# Patient Record
Sex: FEMALE
[De-identification: names, ages, dates, MRNs, and addresses within clinical notes are randomized per-mention and may not be internally consistent; named-entity substitution may affect disease eponyms.]

---

## 2017-02-20 ENCOUNTER — CHARTING TRANS (OUTPATIENT)
Dept: OTHER | Age: 42
End: 2017-02-20

## 2017-02-21 ENCOUNTER — CHARTING TRANS (OUTPATIENT)
Dept: INTERNAL MEDICINE | Age: 42
End: 2017-02-21

## 2018-11-29 VITALS
HEART RATE: 64 BPM | DIASTOLIC BLOOD PRESSURE: 64 MMHG | HEIGHT: 60 IN | RESPIRATION RATE: 16 BRPM | BODY MASS INDEX: 26.31 KG/M2 | TEMPERATURE: 97.9 F | WEIGHT: 134 LBS | SYSTOLIC BLOOD PRESSURE: 90 MMHG

## 2019-04-18 ENCOUNTER — OFFICE VISIT (OUTPATIENT)
Dept: FAMILY MEDICINE CLINIC | Facility: CLINIC | Age: 44
End: 2019-04-18

## 2019-04-18 VITALS
HEART RATE: 66 BPM | RESPIRATION RATE: 18 BRPM | HEIGHT: 59.5 IN | BODY MASS INDEX: 26.26 KG/M2 | OXYGEN SATURATION: 99 % | WEIGHT: 132 LBS | SYSTOLIC BLOOD PRESSURE: 94 MMHG | DIASTOLIC BLOOD PRESSURE: 56 MMHG | TEMPERATURE: 98 F

## 2019-04-18 DIAGNOSIS — Z13.0 SCREENING FOR ENDOCRINE, NUTRITIONAL, METABOLIC AND IMMUNITY DISORDER: ICD-10-CM

## 2019-04-18 DIAGNOSIS — Z00.00 ANNUAL PHYSICAL EXAM: Primary | ICD-10-CM

## 2019-04-18 DIAGNOSIS — Z12.39 SCREENING FOR MALIGNANT NEOPLASM OF BREAST: ICD-10-CM

## 2019-04-18 DIAGNOSIS — Z13.6 SCREENING FOR HEART DISEASE: ICD-10-CM

## 2019-04-18 DIAGNOSIS — E55.9 VITAMIN D DEFICIENCY: ICD-10-CM

## 2019-04-18 DIAGNOSIS — Z13.228 SCREENING FOR ENDOCRINE, NUTRITIONAL, METABOLIC AND IMMUNITY DISORDER: ICD-10-CM

## 2019-04-18 DIAGNOSIS — Z86.2 HISTORY OF ANEMIA: ICD-10-CM

## 2019-04-18 DIAGNOSIS — Z13.21 SCREENING FOR ENDOCRINE, NUTRITIONAL, METABOLIC AND IMMUNITY DISORDER: ICD-10-CM

## 2019-04-18 DIAGNOSIS — Z13.29 SCREENING FOR ENDOCRINE, NUTRITIONAL, METABOLIC AND IMMUNITY DISORDER: ICD-10-CM

## 2019-04-18 DIAGNOSIS — Z13.0 SCREENING FOR IRON DEFICIENCY ANEMIA: ICD-10-CM

## 2019-04-18 DIAGNOSIS — Z12.4 SCREENING FOR CERVICAL CANCER: ICD-10-CM

## 2019-04-18 PROCEDURE — 87624 HPV HI-RISK TYP POOLED RSLT: CPT | Performed by: FAMILY MEDICINE

## 2019-04-18 PROCEDURE — 88175 CYTOPATH C/V AUTO FLUID REDO: CPT | Performed by: FAMILY MEDICINE

## 2019-04-18 PROCEDURE — 99386 PREV VISIT NEW AGE 40-64: CPT | Performed by: FAMILY MEDICINE

## 2019-04-18 RX ORDER — OLOPATADINE HYDROCHLORIDE 1 MG/ML
1 SOLUTION/ DROPS OPHTHALMIC
COMMUNITY
Start: 2019-01-22 | End: 2020-06-01

## 2019-04-18 RX ORDER — CYCLOBENZAPRINE HCL 5 MG
5 TABLET ORAL
COMMUNITY
Start: 2019-01-22 | End: 2019-07-22

## 2019-04-18 RX ORDER — BUSPIRONE HYDROCHLORIDE 5 MG/1
5 TABLET ORAL DAILY
COMMUNITY
Start: 2018-12-14 | End: 2019-07-31

## 2019-04-18 RX ORDER — MONTELUKAST SODIUM 10 MG/1
10 TABLET ORAL NIGHTLY PRN
Refills: 0 | COMMUNITY
Start: 2019-01-22 | End: 2019-07-31

## 2019-04-18 NOTE — PROGRESS NOTES
REASON FOR VISIT:    Geoff Smallwood is a 39year old female who presents for an [address]       , monogamous    menses: q28-35 lasts 3-7  Last pap: >3 years, Dr. Elke Tirado  History of abnormal pap: no  On vit D daily-no - irre 14 03/13/2013    CREATSERUM 0.72 03/13/2013       General Health     How would you describe your current health state?: Fair    Type of Diet: Balanced    How do you maintain positive mental well-being?: Social Interaction; Visiting Friends; Visiting Family blood pressure or other  risk factors No results found for: A1C  GLUCOSE (mg/dL)   Date Value   03/13/2013 80         Gonorrhea Screening if high risk No results found for: GONOCOCCUS    HIV Screening For all adults age 22-65, older adults at increased ris HCl 5 MG Oral Tab Take 5 mg by mouth. Disp:  Rfl:       MEDICAL INFORMATION:   Past Medical History:   Diagnosis Date   • Anxiety    • Arthritis    • Fibromyalgia    • Seasonal allergies       No past surgical history on file.    Family History   Problem Re suspicious lesions  HEENT: atraumatic, normocephalic, ears and throat are clear  EYES:PERRLA, EOMI, normal optic disk, conjunctiva are clear  NECK: supple, no adenopathy, no bruits  CHEST: no chest tenderness  BREAST: no dominant or suspicious mass  LUNGS: Future  - COMP METABOLIC PANEL (14); Future  - LIPID PANEL; Future  - TSH W REFLEX TO FREE T4; Future  - [facility] VANDA 2D+3D SCREENING BILAT (CPT=28049/90701); Future    2. Hx anemia  Start slow iron daily if low    3.  Hx of vit d deficiency  Start otc vit D3 2

## 2019-04-18 NOTE — PATIENT INSTRUCTIONS
Perform labs fasting 8 hours with water or black coffee or or black tea diet  soda only prior to exam.    -Encourage healthy diet of whole food and avoid processed food and sugary drinks and sodas.   Diet should include lean meats and vegetables including 5 Elija todos los calin alimentos de cada tahir de los grupos indicados a continuación. Intente comer el número de porciones recomendado para cada sarah de alimentos.  El tamaño de las porciones se basa en zahida dieta de 2,000 calorías diarias. Hable con victoria médico Las mejores opciones: Carne magra de ave y pescado. Elimine la grasa visible. Prepare la carne a la micaela, al horno, grillada o hervida, en vez de freírla. Quite la piel del charles antes de comerlo.  Limite la cantidad de courtney souza que consume.  [place] du sac

## 2019-04-23 ENCOUNTER — TELEPHONE (OUTPATIENT)
Dept: FAMILY MEDICINE CLINIC | Facility: CLINIC | Age: 44
End: 2019-04-23

## 2019-04-23 PROBLEM — R87.615 PAP SMEAR OF CERVIX UNSATISFACTORY: Status: ACTIVE | Noted: 2019-04-23

## 2019-04-24 NOTE — TELEPHONE ENCOUNTER
LMOM to return call to the office.  Provided pt office phone [phone] along with office hours given.    ----- Message from Janet Mcnair DO sent at 4/23/2019 12:39 PM CDT -----  NL pap and neg HPV- however, inflammation partially obscuring results an

## 2019-04-25 ENCOUNTER — NURSE ONLY (OUTPATIENT)
Dept: FAMILY MEDICINE CLINIC | Facility: CLINIC | Age: 44
End: 2019-04-25

## 2019-04-25 ENCOUNTER — HOSPITAL ENCOUNTER (OUTPATIENT)
Dept: MAMMOGRAPHY | Age: 44
Discharge: HOME | End: 2019-04-25
Attending: FAMILY MEDICINE
Payer: COMMERCIAL

## 2019-04-25 DIAGNOSIS — Z13.0 SCREENING FOR IRON DEFICIENCY ANEMIA: ICD-10-CM

## 2019-04-25 DIAGNOSIS — Z00.00 ANNUAL PHYSICAL EXAM: ICD-10-CM

## 2019-04-25 DIAGNOSIS — E55.9 VITAMIN D DEFICIENCY: ICD-10-CM

## 2019-04-25 DIAGNOSIS — Z13.0 SCREENING FOR ENDOCRINE, NUTRITIONAL, METABOLIC AND IMMUNITY DISORDER: ICD-10-CM

## 2019-04-25 DIAGNOSIS — Z13.6 SCREENING FOR HEART DISEASE: ICD-10-CM

## 2019-04-25 DIAGNOSIS — Z12.39 SCREENING FOR MALIGNANT NEOPLASM OF BREAST: ICD-10-CM

## 2019-04-25 DIAGNOSIS — Z13.228 SCREENING FOR ENDOCRINE, NUTRITIONAL, METABOLIC AND IMMUNITY DISORDER: ICD-10-CM

## 2019-04-25 DIAGNOSIS — Z13.21 SCREENING FOR ENDOCRINE, NUTRITIONAL, METABOLIC AND IMMUNITY DISORDER: ICD-10-CM

## 2019-04-25 DIAGNOSIS — Z13.29 SCREENING FOR ENDOCRINE, NUTRITIONAL, METABOLIC AND IMMUNITY DISORDER: ICD-10-CM

## 2019-04-25 PROCEDURE — 80053 COMPREHEN METABOLIC PANEL: CPT | Performed by: FAMILY MEDICINE

## 2019-04-25 PROCEDURE — 85025 COMPLETE CBC W/AUTO DIFF WBC: CPT | Performed by: FAMILY MEDICINE

## 2019-04-25 PROCEDURE — 77067 SCR MAMMO BI INCL CAD: CPT | Performed by: FAMILY MEDICINE

## 2019-04-25 PROCEDURE — 84443 ASSAY THYROID STIM HORMONE: CPT | Performed by: FAMILY MEDICINE

## 2019-04-25 PROCEDURE — 36415 COLL VENOUS BLD VENIPUNCTURE: CPT | Performed by: FAMILY MEDICINE

## 2019-04-25 PROCEDURE — 80061 LIPID PANEL: CPT | Performed by: FAMILY MEDICINE

## 2019-04-25 PROCEDURE — 77063 BREAST TOMOSYNTHESIS BI: CPT | Performed by: FAMILY MEDICINE

## 2019-04-25 PROCEDURE — 82306 VITAMIN D 25 HYDROXY: CPT | Performed by: FAMILY MEDICINE

## 2019-04-25 NOTE — PROGRESS NOTES
Patient came in for draw of ordered fasting labs. Patient drawn out of left AC, x 1 attempt and tolerated well. Gold, green, and lav tube drawn.

## 2019-04-25 NOTE — TELEPHONE ENCOUNTER
PT in office for lab draw. Reviewed results and recommendations with pt.  Pt verbalized understanding and scheduled appt to repeat pap    Future Appointments   Date Time Provider Violetta Patterson   4/25/2019 12:40 PM BK RADHA RM1 BK [address] [facility]   5/28/20

## 2019-04-29 ENCOUNTER — TELEPHONE (OUTPATIENT)
Dept: FAMILY MEDICINE CLINIC | Facility: CLINIC | Age: 44
End: 2019-04-29

## 2019-04-29 NOTE — TELEPHONE ENCOUNTER
LMOM to return call to the office. Provided pt office phone [phone] along with office hours given.

## 2019-04-29 NOTE — TELEPHONE ENCOUNTER
PT states she will have to call back after 230 pm today because she is at work    ----- Message from Colin Maldonado MD sent at 4/29/2019  6:03 AM CDT -----  + low Vit D. Recommend over-the-counter vitamin D 4000 IU daily.   Recheck vitamin D levels in 6

## 2019-04-30 ENCOUNTER — TELEPHONE (OUTPATIENT)
Dept: FAMILY MEDICINE CLINIC | Facility: CLINIC | Age: 44
End: 2019-04-30

## 2019-04-30 NOTE — TELEPHONE ENCOUNTER
Left detailed message in Kenyan explaining lab results and vitamin D recommendations per Dr. Bret Guardado. Provided pt office phone [phone] along with office hours given.

## 2019-04-30 NOTE — TELEPHONE ENCOUNTER
Pt is returning nurses call in regards to her lab results. Pt requested to be called after 2:30pm, please. Pt prefers Iranian speaking .

## 2019-05-02 NOTE — TELEPHONE ENCOUNTER
LMOM to return call to the office. Provided pt office phone [phone] along with office hours given.      + low Vit D. Recommend over-the-counter vitamin D 4000 IU daily.   Recheck vitamin D levels in 6 months.     Rest of labs stable

## 2019-05-28 ENCOUNTER — OFFICE VISIT (OUTPATIENT)
Dept: FAMILY MEDICINE CLINIC | Facility: CLINIC | Age: 44
End: 2019-05-28

## 2019-05-28 VITALS
RESPIRATION RATE: 18 BRPM | SYSTOLIC BLOOD PRESSURE: 96 MMHG | WEIGHT: 131 LBS | TEMPERATURE: 98 F | OXYGEN SATURATION: 99 % | DIASTOLIC BLOOD PRESSURE: 56 MMHG | HEART RATE: 56 BPM | BODY MASS INDEX: 26 KG/M2

## 2019-05-28 DIAGNOSIS — G56.03 BILATERAL CARPAL TUNNEL SYNDROME: Primary | ICD-10-CM

## 2019-05-28 DIAGNOSIS — R87.615 PAP SMEAR OF CERVIX UNSATISFACTORY: ICD-10-CM

## 2019-05-28 DIAGNOSIS — N72 INFLAMMATION OF CERVIX: ICD-10-CM

## 2019-05-28 DIAGNOSIS — R87.618 OTHER ABNORMAL CYTOLOGICAL FINDING OF SPECIMEN FROM CERVIX: ICD-10-CM

## 2019-05-28 PROBLEM — R87.619 ABNORMAL PAP SMEAR OF CERVIX: Status: ACTIVE | Noted: 2019-05-28

## 2019-05-28 PROCEDURE — 87624 HPV HI-RISK TYP POOLED RSLT: CPT | Performed by: FAMILY MEDICINE

## 2019-05-28 PROCEDURE — 88175 CYTOPATH C/V AUTO FLUID REDO: CPT | Performed by: FAMILY MEDICINE

## 2019-05-28 PROCEDURE — 99213 OFFICE O/P EST LOW 20 MIN: CPT | Performed by: FAMILY MEDICINE

## 2019-05-28 RX ORDER — MELOXICAM 15 MG/1
15 TABLET ORAL DAILY
Qty: 30 TABLET | Refills: 0 | Status: SHIPPED | OUTPATIENT
Start: 2019-05-28 | End: 2019-05-29 | Stop reason: SINTOL

## 2019-05-28 NOTE — PATIENT INSTRUCTIONS
As of October 6th 2014, the Drug Enforcement Agency [facility]) is reclassifying all hydrocodone combination medications from Schedule III to Schedule II. This includes medications such as Norco, Vicodin, Lortab, Zohydro, and Vicoprofen.      What this means for effects. Your are at risk of GI upset, stomach ulcer, gastrointestional bleeding while taking anti-inflammatory medications. If you are having stomach pain or worsening heartburn, stop the medication and call a physician immediately.  If black stool, go to mañana  · Dificultad para cerrar la mano en un puño  · Debilidad y torpeza en la mano  Tratamiento del síndrome del túnel carpiano  Ciertos tratamientos pueden ayudar a reducir la presión sobre el nervio mediano y [employer] síntomas.  Las opciones de trat el antebrazo hasta la colon de la [place]. Puede pellizcarse o presionarse cuando pasa por el elvi bernal desde la [employer]  Dormir de esta forma incorrecta puede hacer que sienta entumecimiento, hormigueo, dolor o debilidad en la mano y WATZING forzado y repetitivo. [place] le da tiempo a victoria sandie para recuperarse. El uso de herramientas eléctricas ayuda a reducir la fuerza. Fort[place] los [employer]  Los músculos débiles pueden causar zahida bernice posición de la sandie o del [facility].  [address] [place] [address] dedos y las muñecas a menudo para evitar la rigidez. 4. Algunas veces los cambios en el lugar de trabajo pueden [employer] síntomas.  Si usted pasa la mayor parte del día escribiendo en un teclado, puede ser útil que cambie la posición del teclado o que a S[place]lo victoria médico puede diagnosticar y tratar un problema de shelli.

## 2019-05-28 NOTE — PROGRESS NOTES
CHIEF COMPLAINT: Patient presents with:  Joint Pain: joint pain and bilateral arm pain; right arm more than left    HPI:     Luigi Almonte is a 39year old female presents for repeat Pap due to inflammation and has an end of menstrual cycle with slig Tab  Disp:  Rfl: 0   Cyclobenzaprine HCl 5 MG Oral Tab Take 5 mg by mouth. Disp:  Rfl:    busPIRone HCl 5 MG Oral Tab Take 5 mg by mouth.  Disp:  Rfl:        Allergies:    Etodolac                NAUSEA ONLY    Comment:dizziness             dizziness  Aspir Total 04/25/2019 9.0    • Calculated Osmolality 04/25/2019 286    • GFR, Non- 04/25/2019 106    • GFR, -American 04/25/2019 122    • AST 04/25/2019 18    • ALT 04/25/2019 22    • Alkaline Phosphatase 04/25/2019 53    • Bilirubin, Tot Comment 04/18/2019                      Value: This result contains rich text formatting which cannot be displayed here. • HPV High Risk mRNA 04/18/2019                      Value: This result contains rich text formatting which cannot be displayed here. stomach upset and call  Dispense: 30 tablet; Refill: 0  Pathophysiology discussed  Sleep in splints, wear at work if needed  Risks and benefits of NSAIDs discussed-take with food and water stop quality stomach upset or vomiting or burning in the stomach.

## 2019-05-29 ENCOUNTER — TELEPHONE (OUTPATIENT)
Dept: FAMILY MEDICINE CLINIC | Facility: CLINIC | Age: 44
End: 2019-05-29

## 2019-05-29 DIAGNOSIS — G56.03 BILATERAL CARPAL TUNNEL SYNDROME: Primary | ICD-10-CM

## 2019-05-29 NOTE — TELEPHONE ENCOUNTER
LMOM to return call to the office. Provided pt office phone [phone] along with office hours given. Left detailed message informing patient of allergy to meloxicam and need to discuss alternate option.  Left message in Amharic to return call

## 2019-05-29 NOTE — TELEPHONE ENCOUNTER
Sukhjinder Cortes from [address] called stating there is an allergy issue with the prescribed medication used for pain and inflammation. Please call tracy back at [phone].

## 2019-05-29 NOTE — TELEPHONE ENCOUNTER
LMOM to return call to the office as this is my 2nd attempt to try to reach you. Provided pt office phone [phone] along with office hours given.

## 2019-05-29 NOTE — TELEPHONE ENCOUNTER
Possible for reaction since ASA allergy- facial swelling  Hold Nsaids- discontinue meloxicam  Offer pt medrol dose pack. If agrees, then call to pharmacy. Discussed medrol dose and how to take at yesterday OV when discussed tx options.  Must take with food

## 2019-05-30 ENCOUNTER — TELEPHONE (OUTPATIENT)
Dept: FAMILY MEDICINE CLINIC | Facility: CLINIC | Age: 44
End: 2019-05-30

## 2019-05-31 NOTE — TELEPHONE ENCOUNTER
Discussed with patient pathophysiology and depth of carpal tunnel syndrome and conservative treatment. Patient needs to  splints it is okay if she does not want to take medication.   If she has still has pain in the next 2 to 3 weeks and she can fol

## 2019-05-31 NOTE — TELEPHONE ENCOUNTER
S/w pt. Pt states she does not want to take medrol dose pack, or any other medications. Pt has yet to  wrist splints. Pt had previously cancelled appt that was sched for 6/17.  Pt wants rec if should f/u in 3 weeks with PCP or if any other testing or

## 2019-05-31 NOTE — TELEPHONE ENCOUNTER
Using  services, informed pt of md recommendations. Pt verbalized understanding.  Attempted provide pt information for for hand ortho specialist and contact information, pt states she will call back in 2 weeks for information if symptoms not reso

## 2019-06-29 ENCOUNTER — OFFICE VISIT (OUTPATIENT)
Dept: FAMILY MEDICINE CLINIC | Facility: CLINIC | Age: 44
End: 2019-06-29

## 2019-06-29 DIAGNOSIS — M54.42 ACUTE LEFT-SIDED LOW BACK PAIN WITH LEFT-SIDED SCIATICA: Primary | ICD-10-CM

## 2019-06-29 PROCEDURE — 99213 OFFICE O/P EST LOW 20 MIN: CPT | Performed by: NURSE PRACTITIONER

## 2019-06-29 RX ORDER — METHYLPREDNISOLONE 4 MG/1
TABLET ORAL
Qty: 1 KIT | Refills: 0 | Status: SHIPPED | OUTPATIENT
Start: 2019-06-29 | End: 2019-07-22 | Stop reason: ALTCHOICE

## 2019-06-29 NOTE — PATIENT INSTRUCTIONS
Ciática  La ciática es zahida afección que produce dolor en la parte baja de la espalda y [place] glúteos, la cadera y la pierna. En ocasiones, podría doler la pierna sin que haya dolor en la espalda.  La ciática se produce cuando un nervio lombardi está ir · Es posible que necesite permanecer en cama los primeros días. Allegra, tan pronto ghassan le sea posible, comience a sentarse o caminar. Eso le ayudará a evitar los problemas que se producen por estar mucho tiempo en cama.   · Mientras esté en la cama, intente Si le hall hecho radiografías, las evaluará un radiólogo. Le informarán de los nuevos hallazgos que puedan afectar victoria atención [place]. ¿Cuándo debe buscar atención médica?   Llame a victoria proveedor de Michael [facility] de inmediato si sucede cualquiera de las si

## 2019-06-29 NOTE — PROGRESS NOTES
CHIEF COMPLAINT:     No chief complaint on file. HPI:   Anna Stock is a 39year old female who is here for complaints of back pain. Pain is located at left low back, low back.  Pain is described as aching, sharp, shooting, moves down left but There were no vitals taken for this visit.    GENERAL: well developed, well nourished,in no apparent distress  SKIN: no rashes,no suspicious lesions  NECK: supple,no adenopathy,no bruits  LUNGS: clear to auscultation  CARDIO: RRR without murmur  GI: normoac La ciática es zahida afección que produce dolor en la parte baja de la espalda y [place] glúteos, la cadera y la pierna. En ocasiones, podría doler la pierna sin que haya dolor en la espalda.  La ciática se produce cuando un nervio lombardi está irritado o cu · Es posible que necesite permanecer en cama los primeros días. Allegra, tan pronto ghassan le sea posible, comience a sentarse o caminar. Eso le ayudará a evitar los problemas que se producen por estar mucho tiempo en cama.   · Mientras esté en la cama, intente Si le hall hecho radiografías, las evaluará un radiólogo. Le informarán de los nuevos hallazgos que puedan afectar victoria atención [place]. ¿Cuándo debe buscar atención médica?   Llame a victoria proveedor de Michael [facility] de inmediato si sucede cualquiera de las si

## 2019-07-22 ENCOUNTER — OFFICE VISIT (OUTPATIENT)
Dept: FAMILY MEDICINE CLINIC | Facility: CLINIC | Age: 44
End: 2019-07-22

## 2019-07-22 VITALS
RESPIRATION RATE: 18 BRPM | TEMPERATURE: 98 F | SYSTOLIC BLOOD PRESSURE: 98 MMHG | BODY MASS INDEX: 25 KG/M2 | DIASTOLIC BLOOD PRESSURE: 60 MMHG | WEIGHT: 127.81 LBS | HEART RATE: 74 BPM | OXYGEN SATURATION: 100 %

## 2019-07-22 DIAGNOSIS — M79.7 FIBROMYALGIA: ICD-10-CM

## 2019-07-22 DIAGNOSIS — M54.16 LUMBAR BACK PAIN WITH RADICULOPATHY AFFECTING LEFT LOWER EXTREMITY: ICD-10-CM

## 2019-07-22 DIAGNOSIS — S46.811A TRAPEZIUS STRAIN, RIGHT, INITIAL ENCOUNTER: ICD-10-CM

## 2019-07-22 DIAGNOSIS — R31.29 MICROSCOPIC HEMATURIA: ICD-10-CM

## 2019-07-22 DIAGNOSIS — R30.0 BURNING WITH URINATION: Primary | ICD-10-CM

## 2019-07-22 LAB
APPEARANCE: CLEAR
MULTISTIX LOT#: ABNORMAL NUMERIC
PH, URINE: 5.5 (ref 4.5–8)
SPECIFIC GRAVITY: 1.02 (ref 1–1.03)
URINE-COLOR: YELLOW
UROBILINOGEN,SEMI-QN: 0.2 MG/DL (ref 0–1.9)

## 2019-07-22 PROCEDURE — 81003 URINALYSIS AUTO W/O SCOPE: CPT | Performed by: FAMILY MEDICINE

## 2019-07-22 PROCEDURE — 87077 CULTURE AEROBIC IDENTIFY: CPT | Performed by: FAMILY MEDICINE

## 2019-07-22 PROCEDURE — 99214 OFFICE O/P EST MOD 30 MIN: CPT | Performed by: FAMILY MEDICINE

## 2019-07-22 PROCEDURE — 87086 URINE CULTURE/COLONY COUNT: CPT | Performed by: FAMILY MEDICINE

## 2019-07-22 RX ORDER — NITROFURANTOIN 25; 75 MG/1; MG/1
100 CAPSULE ORAL 2 TIMES DAILY
Qty: 14 CAPSULE | Refills: 0 | Status: SHIPPED | OUTPATIENT
Start: 2019-07-22 | End: 2019-07-31 | Stop reason: ALTCHOICE

## 2019-07-22 RX ORDER — NAPROXEN 250 MG/1
250 TABLET ORAL 2 TIMES DAILY WITH MEALS
Qty: 30 TABLET | Refills: 0 | Status: SHIPPED | OUTPATIENT
Start: 2019-07-22 | End: 2019-07-31 | Stop reason: ALTCHOICE

## 2019-07-22 RX ORDER — CYCLOBENZAPRINE HCL 10 MG
10 TABLET ORAL NIGHTLY PRN
Qty: 30 TABLET | Refills: 0 | Status: SHIPPED | OUTPATIENT
Start: 2019-07-22 | End: 2020-01-07

## 2019-07-22 NOTE — PROGRESS NOTES
CHIEF COMPLAINT: Patient presents with:  Back Pain: Left side; pain radiates from neck down to leg   Urinary Symptoms (urologic): burning with urination     HPI:     Aurelio Perry is a 39year old female presents for complains of low left-sided back Sister    • No Known Problems Brother    • No Known Problems Brother    • No Known Problems Brother    • No Known Problems Daughter    • No Known Problems Son    • ADHD Son       Social History: Social History    Tobacco Use      Smoking status: Never Smok extensionand able to perform chin to chest with no difficulty. With head extension and caudad pressure patient denies tingling or lightening sensation in back. Heart: RRR without S3 or S4 murmur . Clear S1S2  Lungs: clear to auscultation bilaterally.  No r Normal magnetic resonance imaging of the cervical spine. Janie Carvajal. RAULITO Hong.                                                      [facility] At L5-S1 there is broad-based bulging of the annulus fibrosus with an   annular tear and with degenerative endplate changes at the L5-S1 disk   space level.   In addition, I suspect that there is a small   sup Dispense: 30 tablet; Refill: 0   risks and benefits of NSAIDs discussed-take with food and water risk of GI bleed.   If hives or facial swelling stop immediately and call  neuro is intact with positive left-sided straight leg raising test  Has degenerative learning. Medical education done. Outcome: Patient verbalizes understanding. Patient is notified to call with any questions, comp lications, allergies, or worsening or changing symptoms.   Patient is to call with any side effects or complications from the

## 2019-07-22 NOTE — PATIENT INSTRUCTIONS
As of October 6th 2014, the Drug Enforcement Agency [facility]) is reclassifying all hydrocodone combination medications from Schedule III to Schedule II. This includes medications such as Norco, Vicodin, Lortab, Zohydro, and Vicoprofen.      What this means for points shown above. Fibromyalgia is a condition that causes pain at specific points on the body, stiffness, and fatigue. What are the symptoms of fibromyalgia? In most cases, you will have tender points on your body.  These points are very sore, espec be helpful for fibromyalgia. Some people find alternative treatments such as massage, chiropractic treatments, biofeedback, or acupuncture also help with symptoms. Date Last Reviewed: 6/1/2018  © 5219-3605 [employer] [address].  [facility] o pararse para evitar los problemas que pueden aparecer cuando tahir está en cama mucho tiempo (debilidad de los músculos, mayor dolor y rigidez de la espalda, coágulos de liyah en las piernas).   2. Mientras esté en cama, intente buscar zahida posición que le radiografías (X-rays), éstas serán evaluadas por un especialista.  Le informarán de los nuevos hallazgos que puedan afectar la atención médica que necesita.]  Busque Prontamente Atención Médica  si algo de lo siguiente ocurre:  · El dolor empeora o se espar puede relajar los músculos adoloridos y mejorar el flujo de Zaid. · Pruebe a bañarse o ducharse con agua tibia. También puede usar zahida almohadilla térmica graduada en bajo. Para evitar quemarse, mantenga un paño entre victoria piel y la almohadilla térmica. Para diagnosticar y tratar la disuria crónica puede ser necesaria la derivación a un especialista (urólogo). Cuidado En [place]:  · Si le tomaron zahida muestra de orina para cultivo puede llamar en dos días por el resultado.   · Si se hizo un cultivo para Sydell Prow e your healthcare provider to help find new stones. You may need follow-up every 3 months to a year for a lifetime. Drink lots of water  Staying well-hydrated is the best way to reduce your risk of future stones. Drink 8 12-ounce glasses of water daily.  H [address]. All rights reserved. This information is not intended as a substitute for professional medical care. Always follow your healthcare professional's instructions. Understanding Kidney Stones  Your kidneys are bean-shaped organs.  They help filter often depend on your stone’s size and location. Fever may indicate a serious infection. Call your healthcare provider right away if you develop a fever. Date Last Reviewed: 1/1/2017  © 2535-5420 The [address].  Nicolás Reyes [address]ih[address] Hendricks,

## 2019-07-23 ENCOUNTER — TELEPHONE (OUTPATIENT)
Dept: FAMILY MEDICINE CLINIC | Facility: CLINIC | Age: 44
End: 2019-07-23

## 2019-07-23 NOTE — TELEPHONE ENCOUNTER
Patient needs new script for medication prescribed. States pharmacist let her know it may have an ingredient she is allergic to. Please call back.      [phone]

## 2019-07-24 RX ORDER — IBUPROFEN 600 MG/1
600 TABLET ORAL EVERY 8 HOURS PRN
Qty: 40 TABLET | Refills: 0 | Status: SHIPPED | OUTPATIENT
Start: 2019-07-24 | End: 2019-07-31 | Stop reason: ALTCHOICE

## 2019-07-24 NOTE — TELEPHONE ENCOUNTER
Pharmacist informed that pcp is ok with ibuprofen rx and that patient must stop and seek medical attention immediately if experiences any facial swelling or difficulty breathing.  Pharmacist v/u

## 2019-07-24 NOTE — TELEPHONE ENCOUNTER
Anna Jones from [facility] is calling, stating the medication prescribed for pain and inflammation has a reaction on pt. Pt has allergies to some off the ingredients.  Please call pharmacy back as soon as possible to speak with them to see if it can be swi

## 2019-07-24 NOTE — TELEPHONE ENCOUNTER
Maty Jackson with ibuprofen- please call pharmacy.  If any facial swelling or breathing issues, stop and call immediately

## 2019-07-24 NOTE — TELEPHONE ENCOUNTER
Pharmacist states Naproxen conflicts with patient's aspirin allergy. Patient had previously reported minor swelling with aspirin but pharmacists states that patient has taken ibuprofen in the past with no issues.      Pharmacist would like the \"ok\" from p

## 2019-07-25 ENCOUNTER — TELEPHONE (OUTPATIENT)
Dept: FAMILY MEDICINE CLINIC | Facility: CLINIC | Age: 44
End: 2019-07-25

## 2019-07-25 NOTE — TELEPHONE ENCOUNTER
Notes recorded by Sis Ross DO on 7/24/2019 at 11:55 PM CDT  On Macrodantin-awaprincess sensitivities

## 2019-07-26 ENCOUNTER — TELEPHONE (OUTPATIENT)
Dept: FAMILY MEDICINE CLINIC | Facility: CLINIC | Age: 44
End: 2019-07-26

## 2019-07-26 NOTE — TELEPHONE ENCOUNTER
Result Notes for URINE CULTURE, ROUTINE     Notes recorded by Humberto Giles DO on 7/26/2019 at 11:28 AM CDT  Pt with uti on macrobid. Please call and make sure improving. No sensitivity testing performed.   ------    Notes recorded by Humberto Giles DO o

## 2019-07-29 NOTE — TELEPHONE ENCOUNTER
Kota Carter called to inform this RN finished Hardik Isaac today 7/29/2019, still having pain with urination, also feeling as if she has chills, denies having taken her temperature.  Patient states she is unable to drink a lot of water while at work due to not allow

## 2019-07-29 NOTE — TELEPHONE ENCOUNTER
LMOM to return call to the office as this is my 2nd attempt to try to reach you. Provided pt office phone [phone] along with office hours given. Attempted to inform of Dr. Derrick Hart message : Pt with UTI on macrobid.  Please call and make sure im

## 2019-07-30 NOTE — TELEPHONE ENCOUNTER
Left detailed message informing patient she needs to be re-evaluated today. Informed her pcp is out of the office but may contact to schedule appt with Dr. Sushma Coulter. LMOM to return call to the office.  Provided pt office phone [phone] along with of

## 2019-07-30 NOTE — TELEPHONE ENCOUNTER
She needs to be seen today. At risk of kidney infection if UTI. Elizabeth Felix is not in the office after 1 pm. She will need to be reevaluated in the IC today. If refuses, then offer 8 am appt with  in am. Needs 64oz of water daily.  Please call

## 2019-07-30 NOTE — TELEPHONE ENCOUNTER
She needs to be seen and recheck urine. Antibiotic should have covered. Please offer appt with Wanda Miles who is in office.

## 2019-07-31 ENCOUNTER — HOSPITAL ENCOUNTER (OUTPATIENT)
Dept: CT IMAGING | Age: 44
Discharge: HOME | End: 2019-07-31
Attending: FAMILY MEDICINE
Payer: COMMERCIAL

## 2019-07-31 ENCOUNTER — TELEPHONE (OUTPATIENT)
Dept: FAMILY MEDICINE CLINIC | Facility: CLINIC | Age: 44
End: 2019-07-31

## 2019-07-31 ENCOUNTER — OFFICE VISIT (OUTPATIENT)
Dept: FAMILY MEDICINE CLINIC | Facility: CLINIC | Age: 44
End: 2019-07-31

## 2019-07-31 VITALS
DIASTOLIC BLOOD PRESSURE: 60 MMHG | HEART RATE: 80 BPM | BODY MASS INDEX: 26 KG/M2 | SYSTOLIC BLOOD PRESSURE: 86 MMHG | WEIGHT: 129 LBS | HEIGHT: 58.86 IN | TEMPERATURE: 98 F

## 2019-07-31 DIAGNOSIS — R30.0 BURNING WITH URINATION: ICD-10-CM

## 2019-07-31 DIAGNOSIS — R31.29 MICROSCOPIC HEMATURIA: ICD-10-CM

## 2019-07-31 DIAGNOSIS — N30.01 ACUTE CYSTITIS WITH HEMATURIA: Primary | ICD-10-CM

## 2019-07-31 DIAGNOSIS — J30.2 SEASONAL ALLERGIC RHINITIS, UNSPECIFIED TRIGGER: ICD-10-CM

## 2019-07-31 LAB
BILIRUBIN: NEGATIVE
GLUCOSE (URINE DIPSTICK): NEGATIVE MG/DL
KETONES (URINE DIPSTICK): NEGATIVE MG/DL
MULTISTIX LOT#: ABNORMAL NUMERIC
NITRITE, URINE: NEGATIVE
PH, URINE: 6.5 (ref 4.5–8)
PROTEIN (URINE DIPSTICK): NEGATIVE MG/DL
SPECIFIC GRAVITY: 1.01 (ref 1–1.03)
UROBILINOGEN,SEMI-QN: 0.2 MG/DL (ref 0–1.9)

## 2019-07-31 PROCEDURE — 74176 CT ABD & PELVIS W/O CONTRAST: CPT | Performed by: FAMILY MEDICINE

## 2019-07-31 PROCEDURE — 81003 URINALYSIS AUTO W/O SCOPE: CPT | Performed by: FAMILY MEDICINE

## 2019-07-31 PROCEDURE — 87086 URINE CULTURE/COLONY COUNT: CPT | Performed by: FAMILY MEDICINE

## 2019-07-31 PROCEDURE — 99213 OFFICE O/P EST LOW 20 MIN: CPT | Performed by: FAMILY MEDICINE

## 2019-07-31 RX ORDER — MONTELUKAST SODIUM 10 MG/1
10 TABLET ORAL NIGHTLY PRN
Qty: 90 TABLET | Refills: 1 | Status: SHIPPED | OUTPATIENT
Start: 2019-07-31 | End: 2020-06-01

## 2019-07-31 RX ORDER — OMEGA-3 FATTY ACIDS/FISH OIL 300-1000MG
CAPSULE ORAL DAILY PRN
COMMUNITY
End: 2020-03-03

## 2019-07-31 NOTE — TELEPHONE ENCOUNTER
Called [facility] micro lab to ask about adding on Sensitivities to UCx from today 7/31.  states they do not typically run sensitivities on Corynebacterium growth.

## 2019-07-31 NOTE — TELEPHONE ENCOUNTER
Spoke with patient and informed of  below written message. Patient verbalized and  was rescheduled to be seen today.      Future Appointments   Date Time Provider Violetta Patterson   7/31/2019 10:40 AM Natacha Montes MD [facility]

## 2019-07-31 NOTE — PROGRESS NOTES
CHIEF COMPLAINT: Patient presents with:  UTI  Allergies: requesting a refill on Singulair        HPI:     Cleon Goodpasture is a 39year old female presents for UTI f-u and needs refill of Singulair. Leticia p/w dysuria sx x over 1 week.  She was seen b Never Smoker      Smokeless tobacco: Never Used    Alcohol use: No    Drug use: No       Medications (Active prior to today's visit):    Current Outpatient Medications:  Ibuprofen 200 MG Oral Cap Take 400-600 mg by mouth daily as needed.  Disp:  Rfl:    Cyc and no guarding. No CVAT  +suprapubic tenderness     Neurological: She is alert and oriented to person, place, and time. Skin: Skin is warm and dry.        LABS     Office Visit on 07/22/2019   Component Date Value   • Glucose Urine 07/22/2019 neg    • 04/18/2020  Annual Physical due on 04/18/2020  Mammogram due on 04/25/2020  Pap Smear,3 Years due on 05/28/2022      Patient/Caregiver Education: There are no barriers to learning. Medical education done.    Outcome: Patient verbalizes understanding and agr

## 2019-08-02 ENCOUNTER — TELEPHONE (OUTPATIENT)
Dept: FAMILY MEDICINE CLINIC | Facility: CLINIC | Age: 44
End: 2019-08-02

## 2019-08-02 NOTE — TELEPHONE ENCOUNTER
Patient calling to check status of results and if medication will be sent to pharmacy. Please call back.      [phone]

## 2019-08-02 NOTE — TELEPHONE ENCOUNTER
Reviewed 7/31 Urine Cx, still pending. Called pt to inform of pending UCx results. She states she is feeling much better and that she will be able to drink plenty of water this weekend since she is not working. She has no F/C/N/V, no dysuria sx.  Pt decline

## 2020-01-07 ENCOUNTER — OFFICE VISIT (OUTPATIENT)
Dept: FAMILY MEDICINE CLINIC | Facility: CLINIC | Age: 45
End: 2020-01-07

## 2020-01-07 VITALS
BODY MASS INDEX: 26 KG/M2 | OXYGEN SATURATION: 98 % | RESPIRATION RATE: 18 BRPM | WEIGHT: 129.63 LBS | SYSTOLIC BLOOD PRESSURE: 90 MMHG | HEART RATE: 74 BPM | DIASTOLIC BLOOD PRESSURE: 52 MMHG | TEMPERATURE: 98 F

## 2020-01-07 DIAGNOSIS — Z23 NEED FOR VACCINATION: ICD-10-CM

## 2020-01-07 DIAGNOSIS — M54.16 LUMBAR BACK PAIN WITH RADICULOPATHY AFFECTING LEFT LOWER EXTREMITY: Primary | ICD-10-CM

## 2020-01-07 DIAGNOSIS — S46.811A TRAPEZIUS STRAIN, RIGHT, INITIAL ENCOUNTER: ICD-10-CM

## 2020-01-07 PROCEDURE — 99214 OFFICE O/P EST MOD 30 MIN: CPT | Performed by: FAMILY MEDICINE

## 2020-01-07 RX ORDER — CYCLOBENZAPRINE HCL 10 MG
10 TABLET ORAL NIGHTLY PRN
Qty: 30 TABLET | Refills: 0 | Status: SHIPPED | OUTPATIENT
Start: 2020-01-07 | End: 2020-02-11

## 2020-01-07 RX ORDER — PREDNISONE 20 MG/1
40 TABLET ORAL DAILY
Qty: 10 TABLET | Refills: 0 | Status: SHIPPED | OUTPATIENT
Start: 2020-01-07 | End: 2020-01-12

## 2020-01-07 NOTE — PATIENT INSTRUCTIONS
As of October 6th 2014, the Drug Enforcement Agency [facility]) is reclassifying all hydrocodone combination medications from Schedule III to Schedule II. This includes medications such as Norco, Vicodin, Lortab, Zohydro, and Vicoprofen.      What this means for músculos de la parte baja de la espalda y los abdominales pueden actuar conjuntamente para brindar un buen apoyo a la columna. Los ejercicios descritos a continuación le ayudarán a fortalecer los músculos de la parte baja de la espalda.  Es importante que c gradualmente jed ejercicio hasta repetirlo 20 veces. (Nivel avanzado: Repita jed ejercicio [place] y [employer] piernas al mismo tiempo hasta que estén a unos cuantos centímetros del piso. Sostenga esta posición por 5 segundos y relájese. ) esto 5 veces. Para victoria propia seguridad, consulte con victoria proveedor de atención médica antes de iniciar cualquier programa de ejercicios. Date Last Reviewed: 11/1/2017  © 4541-9616 [employer] [address]. [facility] [facility] [address].  Kelly Scott

## 2020-01-08 NOTE — PROGRESS NOTES
CHIEF COMPLAINT: Patient presents with:  Sciatica: left         HPI:     Jarod Alonso is a 38year old female presents for back and neck pain. Back pain: Pt has had chronic low back pain with sciatica x 7 months. She denies any trauma or inciting event. Known Problems Daughter    • No Known Problems Son    • ADHD Son       Social History: Social History    Tobacco Use      Smoking status: Never Smoker      Smokeless tobacco: Never Used    Alcohol use: No    Drug use: No       Medications (Active prior to well-nourished. HENT:   Head: Normocephalic. Neck: Normal range of motion. Neck supple. Cardiovascular: Normal rate and normal heart sounds. Pulmonary/Chest: Effort normal and breath sounds normal. No respiratory distress.    Musculoskeletal: She e showing disc herniation at L5-S1 with nerve impingement. Neurosurgery was discussed with pt, but pt would like to avoid procedures and surgery,     - cyclobenzaprine 10 MG Oral Tab;  Take 1 tablet (10 mg total) by mouth nightly as needed for Muscle spasms ( persist or worsen.     Problem List:   Patient Active Problem List:     History of anemia     Pap smear of cervix unsatisfactory     Allergic rhinitis     Anemia     Insomnia due to psychological stress     Iron deficiency     Abnormal Pap smear of cervix

## 2020-02-10 ENCOUNTER — TELEPHONE (OUTPATIENT)
Dept: FAMILY MEDICINE CLINIC | Facility: CLINIC | Age: 45
End: 2020-02-10

## 2020-02-10 NOTE — TELEPHONE ENCOUNTER
Call received from patient. Connected call with  services.  ID [id number] Leno Burálvaro    Patient requesting prescription for pain injections for sciatica. Patient is scheduled tomorrow 2/11/2020.  Advised to keep appt with Dr. Vira Lesch to

## 2020-02-10 NOTE — TELEPHONE ENCOUNTER
Pt is calling stating she has really bad Pain in the sciatic nerve, ans has not gone to work for a week now, pt no longer wants to take medication but does want injections or to know what she should do so she can continue her therapy and her work.  Pt needs

## 2020-02-11 ENCOUNTER — TELEPHONE (OUTPATIENT)
Dept: FAMILY MEDICINE CLINIC | Facility: CLINIC | Age: 45
End: 2020-02-11

## 2020-02-11 ENCOUNTER — OFFICE VISIT (OUTPATIENT)
Dept: FAMILY MEDICINE CLINIC | Facility: CLINIC | Age: 45
End: 2020-02-11

## 2020-02-11 VITALS
RESPIRATION RATE: 18 BRPM | WEIGHT: 127.38 LBS | SYSTOLIC BLOOD PRESSURE: 96 MMHG | TEMPERATURE: 98 F | DIASTOLIC BLOOD PRESSURE: 60 MMHG | HEIGHT: 58.75 IN | BODY MASS INDEX: 26.03 KG/M2 | OXYGEN SATURATION: 99 % | HEART RATE: 91 BPM

## 2020-02-11 DIAGNOSIS — M47.9 DEGENERATIVE JOINT DISEASE OF LOW BACK: ICD-10-CM

## 2020-02-11 DIAGNOSIS — M54.59 INTRACTABLE LOW BACK PAIN: ICD-10-CM

## 2020-02-11 DIAGNOSIS — M54.16 LUMBAR BACK PAIN WITH RADICULOPATHY AFFECTING LEFT LOWER EXTREMITY: ICD-10-CM

## 2020-02-11 DIAGNOSIS — M54.59 INTRACTABLE LOW BACK PAIN: Primary | ICD-10-CM

## 2020-02-11 PROCEDURE — 99214 OFFICE O/P EST MOD 30 MIN: CPT | Performed by: FAMILY MEDICINE

## 2020-02-11 RX ORDER — BACLOFEN 10 MG/1
TABLET ORAL
Qty: 30 TABLET | Refills: 0 | Status: SHIPPED | OUTPATIENT
Start: 2020-02-11 | End: 2020-02-28

## 2020-02-11 RX ORDER — BACLOFEN 10 MG/1
10 TABLET ORAL 3 TIMES DAILY
Qty: 30 TABLET | Refills: 0 | Status: SHIPPED | OUTPATIENT
Start: 2020-02-11 | End: 2020-02-11

## 2020-02-11 RX ORDER — IBUPROFEN 800 MG/1
800 TABLET ORAL 3 TIMES DAILY
Qty: 30 TABLET | Refills: 0 | Status: SHIPPED | OUTPATIENT
Start: 2020-02-11 | End: 2020-02-28

## 2020-02-11 RX ORDER — FAMOTIDINE 40 MG/1
40 TABLET, FILM COATED ORAL DAILY
Qty: 30 TABLET | Refills: 0 | Status: SHIPPED | OUTPATIENT
Start: 2020-02-11 | End: 2020-06-01 | Stop reason: ALTCHOICE

## 2020-02-11 RX ORDER — ACETAMINOPHEN 500 MG
500 TABLET ORAL EVERY 6 HOURS PRN
COMMUNITY
End: 2021-03-15 | Stop reason: ALTCHOICE

## 2020-02-11 NOTE — PATIENT INSTRUCTIONS
As of October 6th 2014, the Drug Enforcement Agency [facility]) is reclassifying all hydrocodone combination medications from Schedule III to Schedule II. This includes medications such as Norco, Vicodin, Lortab, Zohydro, and Vicoprofen.      What this means for each dose with food-stop ibuprofen  Take your anti inflammatory medicine with food, stop and call if GI side effects. Your are at risk of GI upset, stomach ulcer, gastrointestional bleeding while taking anti-inflammatory medications.  If you are having stom zahida toalla. (Nunca coloque el hielo directamente sobre la piel.)  · Coloque el hielo sobre la parte de la espalda que más le duele. · No se ponga hielo heriberto más de 20 minutos cada vez. · Deberá usar hielo varias veces al día.   ? Medicamentos  Los calm Earlis Laughter el dolor y la hinchazón  El frío reduce la hinchazón. Tanto el calor ghassan el frío pueden disminuir el dolor. Proteja la piel colocando zahida toalla entre el cuerpo y la mary ann de frío o calor.   · Libby los primeros días, cologen reyna [place], [facility] [facility]. Todos los derechos reservados. Esta información no pretende sustituir la atención médica profesional. Sólo victoria médico puede diagnosticar y tratar un problema de shelli.         Cuidado de la espalda a lo lynne del día  Si usted Ebony de victoria la espalda y los muslos. De esta forma, victoria espalda contará con mejor apoyo. Al caminar, asegúrese de [employer] alineación de las thi curvas dorsales.  Para eso, mantenga victoria rosie, victoria cadera y los dedos de nima pies conectados por zahida línea vertical.   Da la radiculopatía lumbar? Envejecer, zahida lesión, bernice postura, exceso de Remersdaal corporal y otros factores pueden ocasionar problemas en la parte baja de la espalda. Estos problemas pueden luego irritar las raíces nerviosas.  Por ejemplo:  · Daños en un disco ayudarle a aliviar algunos síntomas. En algunos casos, puede necesitar cirugía para corregir el problema subyacente. Eso depende de la causa, los síntomas y cuánto hace que tiene el dolor.   Posibles complicaciones  Con el tiempo, un nervio irritado e infl

## 2020-02-11 NOTE — TELEPHONE ENCOUNTER
[facility] called she needs clarification on directions of prescription for medication Baclofen, since two sets of directions was sent to them.

## 2020-02-11 NOTE — TELEPHONE ENCOUNTER
Pt called to check on the status of Baclofen and Famotidine medications. Pt states that [facility] Leone was calling to get clarification on Baclofen, but she thinks that they are out of stock for Famotidine?      Informed pt that nurse will call Marlyn Leone to get cl

## 2020-02-11 NOTE — TELEPHONE ENCOUNTER
Update baclofen prescription to pharmacy   Spoke with pharmacy to confirm they received it. Famotidine is on back order for a long time due to ranitidine  pharmacies have been recommending OTC,  Ok to do?

## 2020-02-12 ENCOUNTER — TELEPHONE (OUTPATIENT)
Dept: FAMILY MEDICINE CLINIC | Facility: CLINIC | Age: 45
End: 2020-02-12

## 2020-02-12 NOTE — TELEPHONE ENCOUNTER
Dr. Vira Lesch, patient requesting note excusing her from work from 02/03/2020 through 02/23/2020. She will return to work on 02/24/2020. Note pended. Any limitations? Okay for patient to wait to see Dr. Ekaterina Lawrence until March?

## 2020-02-12 NOTE — TELEPHONE ENCOUNTER
Patient called and said that she was referred to a Dr Collette Preciado but she can not get into see him until March wants to know if that is OK and also needs a work not to be out of work for three weeks.

## 2020-02-12 NOTE — PROGRESS NOTES
CHIEF COMPLAINT: Patient presents with:  Low Back Pain: Lt side, pain radiating down Left leg         HPI:     Sneha Lan is a 38year old female presents for bilateral perivertebral and spinal lumbar back pain at L4/L5/S1 level radiating into left calf Brother    • No Known Problems Daughter    • No Known Problems Son    • ADHD Son       Social History: Social History    Tobacco Use      Smoking status: Never Smoker      Smokeless tobacco: Never Used    Alcohol use: No    Drug use: No       Medications ( Well-nourished, well hydrated. No acute distress. No pallor. No tachypnea  HEENT: Head is normocephalic and atraumatic. Sclera clear and non icteric bilaterally. Moist mucous membranes. Neck: supple. No adenopathy. No thyromegaly.   Neck nontender with no 07/31/2019 Slightly Cloudy    • Color Urine 07/31/2019 Pale Yellow/Pink Tinge    • Multistix Lot# 07/31/2019 [id number]    • Multistix Expiration Date 07/31/2019 10/31/2019    • Urine Culture 07/31/2019 No Growth at 18-24 hrs.        Result Narrative           addition I believe that there may be a small   superimposed central disk herniation.  This is in direct contact with   the S1 nerve roots bilaterally though I do not see a great deal of   mass effect on the S1 nerve roots.  There are mild facet joint   deg 800 MG Oral Tab 30 tablet 0     Sig: Take 1 tablet (800 mg total) by mouth 3 (three) times daily. Take with food and water. Stop if GI side effects   • famotidine 40 MG Oral Tab 30 tablet 0     Sig: Take 1 tablet (40 mg total) by mouth daily.  Take with ibu

## 2020-02-13 NOTE — TELEPHONE ENCOUNTER
Patient needs to keep scheduled appointment with Dr. Wally Rodriguez for 2/26/2020. MRI scheduled 2/15/2020. Patient needs to return to work based on consult with Dr. Wally Rodriguez.   Patient may have note stating off work from 2/3/2020-2/27/2020 and may need to be ext

## 2020-02-15 ENCOUNTER — HOSPITAL ENCOUNTER (OUTPATIENT)
Dept: MRI IMAGING | Age: 45
Discharge: HOME | End: 2020-02-15
Attending: FAMILY MEDICINE
Payer: COMMERCIAL

## 2020-02-15 DIAGNOSIS — M54.16 LUMBAR BACK PAIN WITH RADICULOPATHY AFFECTING LEFT LOWER EXTREMITY: ICD-10-CM

## 2020-02-15 DIAGNOSIS — M54.59 INTRACTABLE LOW BACK PAIN: ICD-10-CM

## 2020-02-15 DIAGNOSIS — M47.9 DEGENERATIVE JOINT DISEASE OF LOW BACK: ICD-10-CM

## 2020-02-15 PROCEDURE — 72148 MRI LUMBAR SPINE W/O DYE: CPT | Performed by: FAMILY MEDICINE

## 2020-02-26 ENCOUNTER — TELEPHONE (OUTPATIENT)
Dept: PAIN CLINIC | Facility: CLINIC | Age: 45
End: 2020-02-26

## 2020-02-26 ENCOUNTER — OFFICE VISIT (OUTPATIENT)
Dept: SURGERY | Facility: CLINIC | Age: 45
End: 2020-02-26

## 2020-02-26 ENCOUNTER — OFFICE VISIT (OUTPATIENT)
Dept: PAIN CLINIC | Facility: CLINIC | Age: 45
End: 2020-02-26

## 2020-02-26 VITALS
SYSTOLIC BLOOD PRESSURE: 122 MMHG | BODY MASS INDEX: 23.37 KG/M2 | WEIGHT: 127 LBS | HEIGHT: 62 IN | DIASTOLIC BLOOD PRESSURE: 72 MMHG | HEART RATE: 72 BPM

## 2020-02-26 VITALS
BODY MASS INDEX: 23.37 KG/M2 | SYSTOLIC BLOOD PRESSURE: 110 MMHG | OXYGEN SATURATION: 99 % | HEART RATE: 71 BPM | WEIGHT: 127 LBS | HEIGHT: 62 IN | DIASTOLIC BLOOD PRESSURE: 70 MMHG

## 2020-02-26 DIAGNOSIS — M54.16 LUMBAR RADICULOPATHY: Primary | ICD-10-CM

## 2020-02-26 DIAGNOSIS — M54.16 LUMBAR RADICULITIS: Primary | ICD-10-CM

## 2020-02-26 PROCEDURE — 99204 OFFICE O/P NEW MOD 45 MIN: CPT | Performed by: ANESTHESIOLOGY

## 2020-02-26 PROCEDURE — 99202 OFFICE O/P NEW SF 15 MIN: CPT | Performed by: NEUROLOGICAL SURGERY

## 2020-02-26 NOTE — TELEPHONE ENCOUNTER
Created [facility] Referral for Left TLESI ([id number]) x3  Uploaded Clinicals   Referral #:[id number]  Case Pending

## 2020-02-26 NOTE — PROGRESS NOTES
PHYSICAL EXAM:   Physical Exam   Constitutional:           Patient presents in office today with reported pain in lower back, left leg going into the toes,     Current pain level reported = 6/10    Location of Pain:  lower back, left leg going into the t

## 2020-02-26 NOTE — H&P
[facility]  Neurosurgery Consult    REASON FOR CONSULTATION:  Back and leg pain    HISTORY OF PRESENT ILLNESS:  Abundio Delvalle is a(n) 38year old female with a 9+ month history of back and left leg pain that started insidiously.  Pain is intact. Face is symmetrical and sensation is intact. Tongue is midline.     Motor: 5/5 x 4, no drift   Sensation: intact to light touch bilaterally     Reflexes: 1+   Coordination: deferred   Gait: deferred       DIAGNOSTIC DATA:      IMAGING:  MRI lumbar

## 2020-02-26 NOTE — PROGRESS NOTES
Location of Pain:  Pt states that she has been having issues with lower back pain with radaiitng pain in the bilateral legs. Pt states that she has issues with numbness and tingling in the bilateral legs with no weakness.  Pt states that she has no issues w

## 2020-02-26 NOTE — TELEPHONE ENCOUNTER
Medical clearance needed- NO     Implanted cardiac device/SCS/PNS: n/a     Pt seen in OV today by Montserrat SCHROEDER and recommended for Left TLESI (X 3) with Dr CAMILLE RODGERS . Please begin PA process for procedure(s).      TLESI x 3   Laterality: Left   Level(s): L5-S1, S1-

## 2020-02-26 NOTE — PROGRESS NOTES
Name: Chris Flower   : 1975   DOS: 2020     Chief complaint: Low back pain    History of present illness:  Chris Flower is a 38year old female complaining of  pain since 2019.   The patient works in [employer] and her work involved regi Oral Cap Take 400-600 mg by mouth daily as needed. • Montelukast Sodium 10 MG Oral Tab Take 1 tablet (10 mg total) by mouth nightly as needed. 90 tablet 1   • Olopatadine HCl 0.1 % Ophthalmic Solution Apply 1 drop to eye.        No past surgical history problems. Hematolgy/Lymph: negative for all the hematological problems. Neuropsychiatric:  Denies, anxiety, depression, suicidal ideation.       Physical examination: James Whelan is a 38year old female not in acute distress  /70 (BP Location: Right arm, Robin's, thigh thrust, the Stork or Lake najma, SI joint compression, Yeoman's and Gaenslen's tests are positive on the left side. Romberg test is positive on the left side and negative on the right side.  Straight leg raising test is positive on the left blake

## 2020-02-26 NOTE — PROGRESS NOTES
Spoke with patient. Reviewed pre-op instructions. Patient verbalized understanding, and agreeable to holding medications as indicated .   Encouraged pt to contact office if changes in health status occur (including recent antibiotic use, new wound or s/s of

## 2020-02-26 NOTE — PATIENT INSTRUCTIONS
Refill policies:    • Allow 2-3 business days for refills; controlled substances may take longer.   • Contact your pharmacy at least 5 days prior to running out of medication and have them send an electronic request or submit request through the “request re Depending on your insurance carrier, approval may take 3-10 days. It is highly recommended patients contact their insurance carrier directly to determine coverage.   If test is done without insurance authorization, patient may be responsible for the entire Time:TBD      ** TO AVOID CANCELLATION AND/OR RESCHEDULING: PLEASE CALL [facility] PRE-PROCEDURE LINE [phone] FOR DETAILED INSTRUCTIONS FIVE TO SEVEN DAYS PRIOR TO PROCEDURE**        Prior to the procedure:  Please update us prior to the procedure if you days  • Procedure may be cancelled if INR is elevated.    • Excedrin (with aspirin) 7 days  • Plavix (Clopidogrel)                             7 days      NSAIDs: 24 hours   Meloxicam -- Cervical procedures require 3 days    Ibuprofen (Motrin, Advil, Vicopr INSTRUCTIONS FIVE TO SEVEN DAYS PRIOR TO PROCEDURE**

## 2020-02-27 NOTE — TELEPHONE ENCOUNTER
Prior authorization request completed for: Left TLESI   Authorization #[id number]     Authorization dates: 02/26/20 - 05/26/20  CPT codes approved: 13365,52447  Number of visits/dates of service approved: 3  Physician: Radha Back     Patient can be scheduled

## 2020-02-28 ENCOUNTER — OFFICE VISIT (OUTPATIENT)
Dept: FAMILY MEDICINE CLINIC | Facility: CLINIC | Age: 45
End: 2020-02-28

## 2020-02-28 VITALS
OXYGEN SATURATION: 98 % | WEIGHT: 127 LBS | TEMPERATURE: 98 F | RESPIRATION RATE: 18 BRPM | DIASTOLIC BLOOD PRESSURE: 60 MMHG | HEIGHT: 58.75 IN | BODY MASS INDEX: 25.95 KG/M2 | SYSTOLIC BLOOD PRESSURE: 100 MMHG | HEART RATE: 71 BPM

## 2020-02-28 DIAGNOSIS — M54.59 INTRACTABLE LOW BACK PAIN: ICD-10-CM

## 2020-02-28 DIAGNOSIS — M47.9 DEGENERATIVE JOINT DISEASE OF LOW BACK: ICD-10-CM

## 2020-02-28 DIAGNOSIS — M54.16 LUMBAR BACK PAIN WITH RADICULOPATHY AFFECTING LEFT LOWER EXTREMITY: ICD-10-CM

## 2020-02-28 PROCEDURE — 99213 OFFICE O/P EST LOW 20 MIN: CPT | Performed by: FAMILY MEDICINE

## 2020-02-28 RX ORDER — BACLOFEN 10 MG/1
TABLET ORAL
Qty: 30 TABLET | Refills: 0 | Status: SHIPPED | OUTPATIENT
Start: 2020-02-28 | End: 2020-06-01

## 2020-02-28 RX ORDER — IBUPROFEN 800 MG/1
800 TABLET ORAL 3 TIMES DAILY
Qty: 30 TABLET | Refills: 0 | Status: SHIPPED | OUTPATIENT
Start: 2020-02-28 | End: 2021-03-15

## 2020-02-28 NOTE — PROGRESS NOTES
CHIEF COMPLAINT: No chief complaint on file. HPI:     Ganesh Vega is a 38year old female presents for follow-up bilateral paravertebral and spinal back pain at L4/L5 S1. Procedure scheduled for 3/3/2020 for LESI.   Followed by multiple repeat injectio pain.    HISTORY:  Past Medical History:   Diagnosis Date   • Anemia    • Anxiety    • Arthritis    • Fibromyalgia    • Seasonal allergies       No past surgical history on file.    Family History   Problem Relation Age of Onset   • Other (Other) Father face    PSFH elements reviewed from today and agreed except as otherwise stated in HPI.  ROS:     Review of Systems  Positive for stated complaint: Low back pain with left radiculopathy, denies weakness or numbness in legs.   Pertinent positives and negativ Limited   CVA tenderness: negative     LABS     No visits with results within 2 Month(s) from this visit.    Latest known visit with results is:   Office Visit on 07/31/2019   Component Date Value   • Glucose Urine 07/31/2019 Negative    • Bilirubin 07/31/2 imaging of the lumbar spine                                                                                                    HISTORY:                                                                 Low back pain radiating into the left leg                facet hypertrophy and mild ligamentum flavum thickening. Narrowing of the subarticular zones bilaterally. No significant spinal canal or neural foraminal stenosis. L5-S1:  Large central disc protrusion with bilateral facet hypertrophy.   Narrowing of the Signed Prescriptions Disp Refills   • baclofen 10 MG Oral Tab 30 tablet 0     Sig: START 1/2 tab po qhs x3 days then increase 1 tab po qhs. Causes sedation.  No drive 8h after ingestion- sedation   • ibuprofen 800 MG Oral Tab 30 tablet 0     Sig: Take 1

## 2020-02-28 NOTE — TELEPHONE ENCOUNTER
Spoke to pt to schedule series. Placed on OR schedule for 3/3/, 3/10, and 3/17, case times 1415, 1145, and 1145. Reviewed instructions for sedation including NPO status, need for , check in loc, and NSAID hold.  Pt asked if her 17 y/o son can be her d

## 2020-02-28 NOTE — PATIENT INSTRUCTIONS
As of October 6th 2014, the Drug Enforcement Agency [facility]) is reclassifying all hydrocodone combination medications from Schedule III to Schedule II. This includes medications such as Norco, Vicodin, Lortab, Zohydro, and Vicoprofen.      What this means for

## 2020-03-03 ENCOUNTER — TELEPHONE (OUTPATIENT)
Dept: FAMILY MEDICINE CLINIC | Facility: CLINIC | Age: 45
End: 2020-03-03

## 2020-03-03 ENCOUNTER — HOSPITAL ENCOUNTER (OUTPATIENT)
Facility: HOSPITAL | Age: 45
Setting detail: HOSPITAL OUTPATIENT SURGERY
Discharge: HOME | End: 2020-03-03
Attending: ANESTHESIOLOGY | Admitting: ANESTHESIOLOGY
Payer: COMMERCIAL

## 2020-03-03 ENCOUNTER — APPOINTMENT (OUTPATIENT)
Dept: GENERAL RADIOLOGY | Facility: HOSPITAL | Age: 45
End: 2020-03-03
Attending: ANESTHESIOLOGY
Payer: COMMERCIAL

## 2020-03-03 VITALS
RESPIRATION RATE: 15 BRPM | HEART RATE: 63 BPM | OXYGEN SATURATION: 100 % | DIASTOLIC BLOOD PRESSURE: 62 MMHG | SYSTOLIC BLOOD PRESSURE: 103 MMHG | TEMPERATURE: 97 F

## 2020-03-03 DIAGNOSIS — M54.16 LUMBAR RADICULITIS: ICD-10-CM

## 2020-03-03 LAB
POCT LOT NUMBER: NORMAL
POCT URINE PREGNANCY: NEGATIVE
PROCEDURE CONTROL: YES

## 2020-03-03 PROCEDURE — 3E0R3BZ INTRODUCTION OF ANESTHETIC AGENT INTO SPINAL CANAL, PERCUTANEOUS APPROACH: ICD-10-PCS | Performed by: ANESTHESIOLOGY

## 2020-03-03 PROCEDURE — 99152 MOD SED SAME PHYS/QHP 5/>YRS: CPT | Performed by: ANESTHESIOLOGY

## 2020-03-03 PROCEDURE — 81025 URINE PREGNANCY TEST: CPT | Performed by: ANESTHESIOLOGY

## 2020-03-03 PROCEDURE — 3E0R33Z INTRODUCTION OF ANTI-INFLAMMATORY INTO SPINAL CANAL, PERCUTANEOUS APPROACH: ICD-10-PCS | Performed by: ANESTHESIOLOGY

## 2020-03-03 RX ORDER — LIDOCAINE HYDROCHLORIDE 10 MG/ML
INJECTION, SOLUTION EPIDURAL; INFILTRATION; INTRACAUDAL; PERINEURAL AS NEEDED
Status: DISCONTINUED | OUTPATIENT
Start: 2020-03-03 | End: 2020-03-03

## 2020-03-03 RX ORDER — ONDANSETRON 2 MG/ML
4 INJECTION INTRAMUSCULAR; INTRAVENOUS ONCE AS NEEDED
Status: DISCONTINUED | OUTPATIENT
Start: 2020-03-03 | End: 2020-03-03

## 2020-03-03 RX ORDER — DIPHENHYDRAMINE HYDROCHLORIDE 50 MG/ML
50 INJECTION INTRAMUSCULAR; INTRAVENOUS ONCE AS NEEDED
Status: DISCONTINUED | OUTPATIENT
Start: 2020-03-03 | End: 2020-03-03

## 2020-03-03 RX ORDER — MIDAZOLAM HYDROCHLORIDE 1 MG/ML
INJECTION INTRAMUSCULAR; INTRAVENOUS AS NEEDED
Status: DISCONTINUED | OUTPATIENT
Start: 2020-03-03 | End: 2020-03-03

## 2020-03-03 RX ORDER — SODIUM CHLORIDE, SODIUM LACTATE, POTASSIUM CHLORIDE, CALCIUM CHLORIDE 600; 310; 30; 20 MG/100ML; MG/100ML; MG/100ML; MG/100ML
100 INJECTION, SOLUTION INTRAVENOUS CONTINUOUS
Status: DISCONTINUED | OUTPATIENT
Start: 2020-03-03 | End: 2020-03-03

## 2020-03-03 RX ORDER — DEXAMETHASONE SODIUM PHOSPHATE 10 MG/ML
INJECTION, SOLUTION INTRAMUSCULAR; INTRAVENOUS AS NEEDED
Status: DISCONTINUED | OUTPATIENT
Start: 2020-03-03 | End: 2020-03-03

## 2020-03-03 NOTE — TELEPHONE ENCOUNTER
[employer] [facility] at [facility] sent FMLA forms for completion    Forms completed, to provider bin for review and signature

## 2020-03-03 NOTE — H&P (VIEW-ONLY)
History & Physical Examination    Patient Name: Fili Geiger  MRN: [id number]  CSN: [id number]  YOB: 1975    Pre-Operative Diagnosis:  Lumbar radiculitis [M54.16]    Present Illness: A 38year old female with low back pain is here for left tra No Known Problems Sister    • No Known Problems Brother    • No Known Problems Brother    • No Known Problems Brother    • No Known Problems Daughter    • No Known Problems Son    • ADHD Son      Social History    Tobacco Use      Smoking status: Never Smo

## 2020-03-03 NOTE — H&P
History & Physical Examination    Patient Name: Michael Duval  MRN: [id number]  [facility]: [id number]  YOB: 1975    Pre-Operative Diagnosis:  Lumbar radiculitis [M54.16]    Present Illness: A 38year old female with low back pain is here for left tra No Known Problems Sister    • No Known Problems Brother    • No Known Problems Brother    • No Known Problems Brother    • No Known Problems Daughter    • No Known Problems Son    • ADHD Son      Social History    Tobacco Use      Smoking status: Never Smo

## 2020-03-04 NOTE — TELEPHONE ENCOUNTER
Call placed to patient, advised Dr. Allie Henry will have to sign tomorrow. Per patient- should ebe two forms with separate claim numbers.   [id number] first was faxed to   [id number]- the second was given to Dr. Allie Henry directly, per pa

## 2020-03-04 NOTE — OPERATIVE REPORT
[facility]  Operative Report  3/3/2020     Dayami Aldana Patient Status:  Hospital Outpatient Surgery    1975 MRN [id number]   [facility] ENDOSCOPY Attending No att. providers found   Hosp Day # 0 PCP DO Deb Gonzalez inch Quincke spinal needle was introduced toward the inferior aspect of the junction between the transverse process and pedicle of the left L5-S1 level atraumatically under fluoroscopic guidance.   The needle was advanced into the anterior epidural space at

## 2020-03-05 ENCOUNTER — PATIENT MESSAGE (OUTPATIENT)
Dept: FAMILY MEDICINE CLINIC | Facility: CLINIC | Age: 45
End: 2020-03-05

## 2020-03-05 ENCOUNTER — TELEPHONE (OUTPATIENT)
Dept: SURGERY | Facility: CLINIC | Age: 45
End: 2020-03-05

## 2020-03-05 NOTE — TELEPHONE ENCOUNTER
The form I submitted to [employer]-was given to me by the patient. I have never seen the faxed forms. Patient informed me at the office visit that I needed to complete the forms she was giving me. I will sign them tomorrow.   Thank you

## 2020-03-05 NOTE — TELEPHONE ENCOUNTER
Spoke with Delaney Medina, representative at [facility], to obtain correct form. Delaney Medina has merged these cases under claim [id number]. He states that the forms we have in our possession are the appropriate forms to complete.

## 2020-03-05 NOTE — TELEPHONE ENCOUNTER
Received call from patient. Advised that we do not have form [id number], as it was thought to be a duplicate of the other form. She does not need the other form, just this updated one.      Patient will contact her employer to fax correct form to our

## 2020-03-05 NOTE — TELEPHONE ENCOUNTER
Per Dr. Lemus Side, need to reach out to Dr. Jerris Cranker to complete #17 and 18 on forms. [address] [facility] desk staff at Dr. Alfredo Gamble office took following message for nursing staff. Any functional limitations to ADLs?   Any driving restrictions for this disability

## 2020-03-06 NOTE — TELEPHONE ENCOUNTER
From: Dayami Aldana  To: Lima Walls DO  Sent: 3/5/2020 3:47 PM CST  Subject: Other    [employer]. Jessica Keyes

## 2020-03-06 NOTE — TELEPHONE ENCOUNTER
Forms completed. Form and office notes faxed to [facility] with confirmation. Forms sent to scanning. Patient notified that this has been done.

## 2020-03-06 NOTE — TELEPHONE ENCOUNTER
Following injections, no driving or activity restrictions. Pt requires  at the time of each injection d/t sedation. If further info pertaining to long-term driving/ADL ability is required, functional capacity would be required.       Attempted to cont

## 2020-03-06 NOTE — TELEPHONE ENCOUNTER
Spoke with Nicole Mcghee RN with Dr. Anna Yi. No restrictions to ADLs. No driving day of injections, as can be sedating.

## 2020-03-06 NOTE — TELEPHONE ENCOUNTER
Katelyn Larios called back. Informed recommendations below. Understanding verbalized.  No further needs

## 2020-03-06 NOTE — TELEPHONE ENCOUNTER
Follow-up call placed to Dr. Rina Cooper office. Asked to speak with nurse. Per Marshall Orozco at , nursing staff waiting for 's advice, then will call us back.

## 2020-03-09 ENCOUNTER — TELEPHONE (OUTPATIENT)
Dept: PAIN CLINIC | Facility: CLINIC | Age: 45
End: 2020-03-09

## 2020-03-09 ENCOUNTER — TELEPHONE (OUTPATIENT)
Dept: FAMILY MEDICINE CLINIC | Facility: CLINIC | Age: 45
End: 2020-03-09

## 2020-03-09 DIAGNOSIS — M54.16 LUMBAR BACK PAIN WITH RADICULOPATHY AFFECTING LEFT LOWER EXTREMITY: Primary | ICD-10-CM

## 2020-03-09 NOTE — TELEPHONE ENCOUNTER
Pt called office asking to get a referral for physical therapy, since she does not like the Physical therapist who she is currently with. Please call patient back to [phone]. Pt only speaks Kosovan.

## 2020-03-09 NOTE — TELEPHONE ENCOUNTER
[facility] [place] [id number]    Call placed to patient using  services. Patient states she has been seeing Dr. Trisha Crooks, [address] [facility]. Requesting referral to physical therapy at this time. Pended for signature.

## 2020-03-09 NOTE — TELEPHONE ENCOUNTER
Left message for patient (and spoke w/ pt), confirmed procedure date of 3/10/2020 and to be checked in at outpatient registration at 1045 am with her . Patient instructed to call pre-procedure line before procedure at [phone].  Patient instructed

## 2020-03-10 ENCOUNTER — HOSPITAL ENCOUNTER (OUTPATIENT)
Facility: HOSPITAL | Age: 45
Setting detail: HOSPITAL OUTPATIENT SURGERY
Discharge: HOME | End: 2020-03-10
Attending: ANESTHESIOLOGY | Admitting: ANESTHESIOLOGY
Payer: COMMERCIAL

## 2020-03-10 ENCOUNTER — APPOINTMENT (OUTPATIENT)
Dept: GENERAL RADIOLOGY | Facility: HOSPITAL | Age: 45
End: 2020-03-10
Attending: ANESTHESIOLOGY
Payer: COMMERCIAL

## 2020-03-10 VITALS
TEMPERATURE: 98 F | OXYGEN SATURATION: 100 % | DIASTOLIC BLOOD PRESSURE: 62 MMHG | RESPIRATION RATE: 18 BRPM | HEART RATE: 76 BPM | SYSTOLIC BLOOD PRESSURE: 110 MMHG

## 2020-03-10 DIAGNOSIS — M54.16 LUMBAR RADICULITIS: ICD-10-CM

## 2020-03-10 PROCEDURE — 3E0R3BZ INTRODUCTION OF ANESTHETIC AGENT INTO SPINAL CANAL, PERCUTANEOUS APPROACH: ICD-10-PCS | Performed by: ANESTHESIOLOGY

## 2020-03-10 PROCEDURE — 99152 MOD SED SAME PHYS/QHP 5/>YRS: CPT | Performed by: ANESTHESIOLOGY

## 2020-03-10 PROCEDURE — 81025 URINE PREGNANCY TEST: CPT | Performed by: ANESTHESIOLOGY

## 2020-03-10 PROCEDURE — 3E0R33Z INTRODUCTION OF ANTI-INFLAMMATORY INTO SPINAL CANAL, PERCUTANEOUS APPROACH: ICD-10-PCS | Performed by: ANESTHESIOLOGY

## 2020-03-10 RX ORDER — SODIUM CHLORIDE, SODIUM LACTATE, POTASSIUM CHLORIDE, CALCIUM CHLORIDE 600; 310; 30; 20 MG/100ML; MG/100ML; MG/100ML; MG/100ML
100 INJECTION, SOLUTION INTRAVENOUS CONTINUOUS
Status: DISCONTINUED | OUTPATIENT
Start: 2020-03-10 | End: 2020-03-10

## 2020-03-10 RX ORDER — LIDOCAINE HYDROCHLORIDE 10 MG/ML
INJECTION, SOLUTION EPIDURAL; INFILTRATION; INTRACAUDAL; PERINEURAL AS NEEDED
Status: DISCONTINUED | OUTPATIENT
Start: 2020-03-10 | End: 2020-03-10

## 2020-03-10 RX ORDER — ONDANSETRON 2 MG/ML
4 INJECTION INTRAMUSCULAR; INTRAVENOUS ONCE AS NEEDED
Status: DISCONTINUED | OUTPATIENT
Start: 2020-03-10 | End: 2020-03-10

## 2020-03-10 RX ORDER — DEXAMETHASONE SODIUM PHOSPHATE 10 MG/ML
INJECTION, SOLUTION INTRAMUSCULAR; INTRAVENOUS AS NEEDED
Status: DISCONTINUED | OUTPATIENT
Start: 2020-03-10 | End: 2020-03-10

## 2020-03-10 RX ORDER — DIPHENHYDRAMINE HYDROCHLORIDE 50 MG/ML
50 INJECTION INTRAMUSCULAR; INTRAVENOUS ONCE AS NEEDED
Status: DISCONTINUED | OUTPATIENT
Start: 2020-03-10 | End: 2020-03-10

## 2020-03-10 RX ORDER — MIDAZOLAM HYDROCHLORIDE 1 MG/ML
INJECTION INTRAMUSCULAR; INTRAVENOUS AS NEEDED
Status: DISCONTINUED | OUTPATIENT
Start: 2020-03-10 | End: 2020-03-10

## 2020-03-10 NOTE — OR NURSING
PATIENT WAS ABLE TO STAND AND AMBULATE. SHE STATED LAST INJECTION SHE HAD SHE HAD SOME NUMBNESS IN RIGHT LEG.  UPON TESTING SHE DID NOT HAVE NUMBNESS IN LEFT LEG THAT WAS INJECTION SIDE BUT INSISTED IT FELT DIFFERENT SO NURSE OBSERVED HER FOR ANOTHER 15 MI

## 2020-03-10 NOTE — OPERATIVE REPORT
[facility]  Operative Report  3/10/2020     Gracia Luis Patient Status:  Hospital Outpatient Surgery    1975 MRN [id number]   [facility] ENDOSCOPY Attending No att. providers found   Hosp Day # 0 PCP DO Petar Biggs 22-gauge 5 inch Quincke spinal needle was introduced toward the inferior aspect of the junction between the transverse process and pedicle of the  left L5-S1 level atraumatically under fluoroscopic guidance.   The needle was advanced into the anterior epidu

## 2020-03-10 NOTE — INTERVAL H&P NOTE
Pre-op Diagnosis: Lumbar radiculitis [M54.16]    The above referenced H&P was reviewed by JOSE Marcial on 3/10/2020, the patient was examined and no significant changes have occurred in the patient's condition since the H&P was performed.   I discussed

## 2020-03-10 NOTE — TELEPHONE ENCOUNTER
[facility] [place] [id number]    Call placed to patient using  services. Patient notified that physical therapy order has been place. Provided with phone number to schedule.

## 2020-03-10 NOTE — TELEPHONE ENCOUNTER
Signed PT order for [address].  Cannot write referral for Sandrita Kahn since out of network  Please inform

## 2020-03-11 ENCOUNTER — TELEPHONE (OUTPATIENT)
Dept: PAIN CLINIC | Facility: CLINIC | Age: 45
End: 2020-03-11

## 2020-03-11 NOTE — TELEPHONE ENCOUNTER
Spoke with patient, confirmed procedure date of 3/17/20 and to be checked in at outpatient registration at [phone] am with patient's . Encouraged patient to listen to the pre-procedure line at [phone].  Patient instructed to call office if there are

## 2020-03-16 ENCOUNTER — TELEPHONE (OUTPATIENT)
Dept: SURGERY | Facility: CLINIC | Age: 45
End: 2020-03-16

## 2020-03-16 ENCOUNTER — TELEPHONE (OUTPATIENT)
Dept: FAMILY MEDICINE CLINIC | Facility: CLINIC | Age: 45
End: 2020-03-16

## 2020-03-16 ENCOUNTER — TELEPHONE (OUTPATIENT)
Dept: PAIN CLINIC | Facility: CLINIC | Age: 45
End: 2020-03-16

## 2020-03-16 NOTE — TELEPHONE ENCOUNTER
Called Patient to explain that Dr. Denver San recommended seeing Neuro surgery since she had zero relief from the procedures he did on her. Patient understood and will call back to schedule an OV with neuro surgery.

## 2020-03-16 NOTE — TELEPHONE ENCOUNTER
[place] [id number]    Call placed to patient using  services. Patient states she has not had relief from first 2 injections.     Per telephone today with Dr. Allan Bardales office:  Called Patient to explain that Dr. Jerris Cranker recommended [id number]

## 2020-03-16 NOTE — TELEPHONE ENCOUNTER
Removed case from OR schedule. Case placed in depot. Assessment:  Lumbar radiculopathy.        Plan: My recommendation would be series of 3 left transforaminal lumbar epidural steroid injection at left L5-S1 and S1-S2 level as needed basis.   I also norman

## 2020-03-19 NOTE — TELEPHONE ENCOUNTER
Pt is requesting a letter to go back to work since her PT has been cancelled, but pt wants to go back to work Around 03/30/2020. Pt only speaks Portuguese  Pt wants to be back with specific restrictions please call pt back.

## 2020-03-20 NOTE — TELEPHONE ENCOUNTER
Needs OV to determine back to work and restrictions or reach out to her pain specialist who is managing her back pain for work restrictions and return to work. Please inform.

## 2020-03-20 NOTE — TELEPHONE ENCOUNTER
Spoke with Patient, states she was having some complications or side effects from her first injection.  States she was having some tingling sensation on left side of chest but not chest pain or SOB, states her therapy sessions have been canceled and wants t

## 2020-03-26 ENCOUNTER — APPOINTMENT (OUTPATIENT)
Dept: PHYSICAL THERAPY | Age: 45
End: 2020-03-26
Attending: FAMILY MEDICINE
Payer: COMMERCIAL

## 2020-03-30 ENCOUNTER — TELEPHONE (OUTPATIENT)
Dept: NEUROLOGY | Facility: CLINIC | Age: 45
End: 2020-03-30

## 2020-03-30 ENCOUNTER — OFFICE VISIT (OUTPATIENT)
Dept: PHYSICAL THERAPY | Age: 45
End: 2020-03-30
Attending: FAMILY MEDICINE
Payer: COMMERCIAL

## 2020-03-30 PROCEDURE — 97163 PT EVAL HIGH COMPLEX 45 MIN: CPT

## 2020-03-30 PROCEDURE — 97110 THERAPEUTIC EXERCISES: CPT

## 2020-03-30 NOTE — PROGRESS NOTES
SPINE EVALUATION:   Referring Physician: Dr. Trisha Pierre  Diagnosis: Lumbar back pain with radiculopathy affecting left lower extremity (M54.16)  Date of Service: 3/30/2020     PATIENT SUMMARY   Jose Chaudhry is a 38year old female who presents to therapy to foot and 3rd toe .  The results of the objective tests and measures show increased LLE pain during lumbar extension, hypomobility L4, L5, minimal tenderness on L lumbar paraspinals, moderate tenderness and increased muscle tone on R lumbar paraspinals, tend evaluation, postural corrections and importance of remaining active  Patient was instructed in and issued a HEP for: seated sciatic nerve glide. Patient also received long axis traction of LLE with relief of symptoms.     Charges: PT Eval High Complexity, T 29/100    Patient/Family/Caregiver was advised of these findings, precautions, and treatment options and has agreed to actively participate in planning and for this course of care.     Thank you for your referral. Please co-sign or sign and return this keon

## 2020-03-30 NOTE — TELEPHONE ENCOUNTER
Pt Turkmen speaking. Utilized  line Pina Quinn #[phone]) for phone conversation. Pt asking for a work letter. Pt still having pain, and injections from Dr. Carly Montes did not help.   She is starting PT tomorrow, and is asking for additional time off w

## 2020-04-01 ENCOUNTER — APPOINTMENT (OUTPATIENT)
Dept: PHYSICAL THERAPY | Age: 45
End: 2020-04-01
Attending: FAMILY MEDICINE
Payer: COMMERCIAL

## 2020-04-02 ENCOUNTER — OFFICE VISIT (OUTPATIENT)
Dept: PHYSICAL THERAPY | Age: 45
End: 2020-04-02
Attending: FAMILY MEDICINE
Payer: COMMERCIAL

## 2020-04-02 DIAGNOSIS — M54.16 LUMBAR BACK PAIN WITH RADICULOPATHY AFFECTING LEFT LOWER EXTREMITY: ICD-10-CM

## 2020-04-02 PROCEDURE — 97012 MECHANICAL TRACTION THERAPY: CPT

## 2020-04-02 PROCEDURE — 97110 THERAPEUTIC EXERCISES: CPT

## 2020-04-02 NOTE — PROGRESS NOTES
Dx: : Lumbar back pain with radiculopathy affecting left lower extremity (M54.16)        Insurance (Authorized # of Visits):  8          Authorizing Physician: Dr. Rachana Escalante  Next MD visit: none scheduled  Fall Risk: standard         Precautions: n/a Date:   Tx#: 6/   TE  Treadmill x 5 min 2.0 mph  TRX back stretch 5 sec x 10  Alternate leg lifts 5 each x 10  Piriformis stretch 15 sec x 4  Educated patient on proper supine to sit to supine \"log roll\" technique, proper sitting and standing posture

## 2020-04-06 ENCOUNTER — OFFICE VISIT (OUTPATIENT)
Dept: PHYSICAL THERAPY | Age: 45
End: 2020-04-06
Attending: FAMILY MEDICINE
Payer: COMMERCIAL

## 2020-04-06 DIAGNOSIS — M54.16 LUMBAR BACK PAIN WITH RADICULOPATHY AFFECTING LEFT LOWER EXTREMITY: ICD-10-CM

## 2020-04-06 PROCEDURE — 97110 THERAPEUTIC EXERCISES: CPT

## 2020-04-06 PROCEDURE — 97012 MECHANICAL TRACTION THERAPY: CPT

## 2020-04-06 NOTE — PROGRESS NOTES
Dx: : Lumbar back pain with radiculopathy affecting left lower extremity (M54.16)        Insurance (Authorized # of Visits):  8          Authorizing Physician: Dr. Eleazar Justice  Next MD visit: none scheduled  Fall Risk: standard         Precautions: n/a comprehensive HEP to maintain progress achieved in PT - In progress    Plan: Continue physical therapy to address above goals.   Date: 4/2/2020  TX#: 2/8 Date: 4/6/2020              TX#: 3/8 Date:                 TX#: 4/ Date:                 TX#: 5/ Date:

## 2020-04-08 ENCOUNTER — OFFICE VISIT (OUTPATIENT)
Dept: PHYSICAL THERAPY | Age: 45
End: 2020-04-08
Attending: FAMILY MEDICINE
Payer: COMMERCIAL

## 2020-04-08 DIAGNOSIS — M54.16 LUMBAR BACK PAIN WITH RADICULOPATHY AFFECTING LEFT LOWER EXTREMITY: ICD-10-CM

## 2020-04-08 PROCEDURE — 97110 THERAPEUTIC EXERCISES: CPT

## 2020-04-08 PROCEDURE — 97012 MECHANICAL TRACTION THERAPY: CPT

## 2020-04-08 NOTE — PROGRESS NOTES
Dx: : Lumbar back pain with radiculopathy affecting left lower extremity (M54.16)        Insurance (Authorized # of Visits):  8          Authorizing Physician: Dr. Kathy Welch  Next MD visit: none scheduled  Fall Risk: standard         Precautions: n/a when I ask her what she was doing when she got the pain this next few days.   Goals: (to be met in 8 visits)   · Pt will improve transversus abdominis recruitment to perform proper isometric contraction without requiring verbal or tactile cuing to promote a 10  Lift 5# crate floor to waist 2 x 10  NK pulley rows 30# 2 x 10  RB B shoulder ext 2 x 10  RB B hip flex, abd, ext x 10 each  Education on importance of exercise     NR  Balance board AP x 20  Balance board ML x 20 NR  Balance board AP x 20  Balance boa

## 2020-04-13 ENCOUNTER — OFFICE VISIT (OUTPATIENT)
Dept: PHYSICAL THERAPY | Age: 45
End: 2020-04-13
Attending: FAMILY MEDICINE
Payer: COMMERCIAL

## 2020-04-13 DIAGNOSIS — M54.16 LUMBAR BACK PAIN WITH RADICULOPATHY AFFECTING LEFT LOWER EXTREMITY: ICD-10-CM

## 2020-04-13 PROCEDURE — 97140 MANUAL THERAPY 1/> REGIONS: CPT

## 2020-04-13 PROCEDURE — 97110 THERAPEUTIC EXERCISES: CPT

## 2020-04-13 PROCEDURE — 97014 ELECTRIC STIMULATION THERAPY: CPT

## 2020-04-13 NOTE — PROGRESS NOTES
Dx: : Lumbar back pain with radiculopathy affecting left lower extremity (M54.16)        Insurance (Authorized # of Visits):  8          Authorizing Physician: Dr. Jenny Stone  Next MD visit: none scheduled  Fall Risk: standard         Precautions: n/a tension to tolerate standing > 45 minutes for work and home activities - In progress  · Pt will demonstrate improved core strength to be able to perform housework with <5/10 pain - In progress  · Pt will be independent and compliant with comprehensive HEP Modality  Intermittent lumbar traction 50 lbs/20 lbs, 30 sec/10 sec x 15 min Modality  Intermittent lumbar traction 55 lbs/20 lbs, 30 sec/10 sec x 15 min Modality  Prone:  IFC with HP lumbar paraspinals and piriformis x 15 min       MT  STM B lumbar parasp

## 2020-04-15 ENCOUNTER — OFFICE VISIT (OUTPATIENT)
Dept: PHYSICAL THERAPY | Age: 45
End: 2020-04-15
Attending: FAMILY MEDICINE
Payer: COMMERCIAL

## 2020-04-15 DIAGNOSIS — M54.16 LUMBAR BACK PAIN WITH RADICULOPATHY AFFECTING LEFT LOWER EXTREMITY: ICD-10-CM

## 2020-04-15 PROCEDURE — 97110 THERAPEUTIC EXERCISES: CPT

## 2020-04-15 PROCEDURE — 97014 ELECTRIC STIMULATION THERAPY: CPT

## 2020-04-15 PROCEDURE — 97140 MANUAL THERAPY 1/> REGIONS: CPT

## 2020-04-15 NOTE — PROGRESS NOTES
Dx: : Lumbar back pain with radiculopathy affecting left lower extremity (M54.16)        Insurance (Authorized # of Visits):  8          Authorizing Physician: Dr. Mukesh Kam  Next MD visit: none scheduled  Fall Risk: standard         Precautions: n/a 3/8 Date:  4/8/2020            TX#: 4/8 Date:  4/13/2020               TX#: 5/8 Date: 4/15/2020  Tx#: 6/8   TE  Treadmill x 5 min 2.0 mph  TRX back stretch 5 sec x 10  Alternate leg lifts 5 each x 10  Piriformis stretch 15 sec x 4  Educated patient on prop traction 55 lbs/20 lbs, 30 sec/10 sec x 15 min Modality  Prone:  IFC with HP lumbar paraspinals and piriformis x 15 min Modality  Prone: IFC with HP lumbar paraspinals and piriformis x 15 min      MT  STM B lumbar paraspinals and piriformis x 10 min MT STM

## 2020-04-20 ENCOUNTER — OFFICE VISIT (OUTPATIENT)
Dept: PHYSICAL THERAPY | Age: 45
End: 2020-04-20
Attending: FAMILY MEDICINE
Payer: COMMERCIAL

## 2020-04-20 DIAGNOSIS — M54.16 LUMBAR BACK PAIN WITH RADICULOPATHY AFFECTING LEFT LOWER EXTREMITY: ICD-10-CM

## 2020-04-20 PROCEDURE — 97140 MANUAL THERAPY 1/> REGIONS: CPT

## 2020-04-20 PROCEDURE — 97110 THERAPEUTIC EXERCISES: CPT

## 2020-04-20 PROCEDURE — 97014 ELECTRIC STIMULATION THERAPY: CPT

## 2020-04-20 NOTE — PROGRESS NOTES
Dx: : Lumbar back pain with radiculopathy affecting left lower extremity (M54.16)        Insurance (Authorized # of Visits):  8          Authorizing Physician: Dr. Sushila Cummings  Next MD visit: none scheduled  Fall Risk: standard         Precautions: n/a TX#: 4/8 Date:  4/13/2020               TX#: 5/8 Date: 4/15/2020  Tx#: 6/8 Date: 4//20/2020  Tx: 7/8      TE  Treadmill x 5 min 2.0 mph  TRX back stretch 5 sec x 10  Alternate leg lifts 5 each x 10  Piriformis stretch 15 sec x 4  Educated patient board AP x 20  Balance board ML x 20 NR  Balance board AP x 20  Balance board ML x 20    NR  Balance board AP x 30  Balance board ML x 30 NR  Balance board AP x 30  Balance board ML x 30    Modality  Intermittent  Lumbar traction 50 lbs/20 lbs, 30 sec/10 s

## 2020-04-22 ENCOUNTER — APPOINTMENT (OUTPATIENT)
Dept: PHYSICAL THERAPY | Age: 45
End: 2020-04-22
Attending: FAMILY MEDICINE
Payer: COMMERCIAL

## 2020-04-27 ENCOUNTER — OFFICE VISIT (OUTPATIENT)
Dept: PHYSICAL THERAPY | Age: 45
End: 2020-04-27
Attending: FAMILY MEDICINE
Payer: COMMERCIAL

## 2020-04-27 DIAGNOSIS — M54.16 LUMBAR BACK PAIN WITH RADICULOPATHY AFFECTING LEFT LOWER EXTREMITY: ICD-10-CM

## 2020-04-27 PROCEDURE — 97110 THERAPEUTIC EXERCISES: CPT

## 2020-04-27 NOTE — PROGRESS NOTES
Dx: : Lumbar back pain with radiculopathy affecting left lower extremity (M54.16)        Insurance (Authorized # of Visits):  8          Authorizing Physician: Dr. Sid Harden  Next MD visit: none scheduled  Fall Risk: standard         Precautions: n/a flexion to > 40 deg to allow increase ease with bending forward to don shoes - Met  · Pt will report improved symptom centralization and absence of radicular symptoms for 3 consecutive days to improve function with ADL - Met  · Pt will have decreased amy medball 2 x 10  Educated patient on proper way to clean bathtub.  Advised patient against sitting [facility] style on floor  Supine sciatic nerve glide LLE 5 sec x 10   TE  Called "[employer] who talked with patient and me regarding claim and ret -   Modality  Intermittent  Lumbar traction 50 lbs/20 lbs, 30 sec/10 sec x 15 min Modality  Intermittent lumbar traction 50 lbs/20 lbs, 30 sec/10 sec x 15 min Modality  Intermittent lumbar traction 55 lbs/20 lbs, 30 sec/10 sec x 15 min Modality  Prone:  IF

## 2020-04-29 ENCOUNTER — APPOINTMENT (OUTPATIENT)
Dept: PHYSICAL THERAPY | Age: 45
End: 2020-04-29
Attending: FAMILY MEDICINE
Payer: COMMERCIAL

## 2020-05-07 ENCOUNTER — TELEPHONE (OUTPATIENT)
Dept: PAIN CLINIC | Facility: CLINIC | Age: 45
End: 2020-05-07

## 2020-05-07 NOTE — TELEPHONE ENCOUNTER
Patient declined Pre-Procedure H&P, states TLESIs did not work for her and does not want to reschedule her cancelled 3/17 appointment. Patient advised to schedule appointment in future if needed.

## 2020-05-22 ENCOUNTER — VIRTUAL PHONE E/M (OUTPATIENT)
Dept: FAMILY MEDICINE CLINIC | Facility: CLINIC | Age: 45
End: 2020-05-22

## 2020-05-22 DIAGNOSIS — H92.02 LEFT EAR PAIN: Primary | ICD-10-CM

## 2020-05-22 DIAGNOSIS — Z12.31 ENCOUNTER FOR SCREENING MAMMOGRAM FOR BREAST CANCER: ICD-10-CM

## 2020-05-22 DIAGNOSIS — Z71.89 ADVICE GIVEN ABOUT COVID-19 VIRUS BY TELEPHONE: ICD-10-CM

## 2020-05-22 DIAGNOSIS — Z87.09 HISTORY OF SORE THROAT: ICD-10-CM

## 2020-05-22 PROCEDURE — 99214 OFFICE O/P EST MOD 30 MIN: CPT | Performed by: FAMILY MEDICINE

## 2020-05-22 RX ORDER — AZITHROMYCIN 250 MG/1
TABLET, FILM COATED ORAL
Qty: 6 TABLET | Refills: 0 | Status: SHIPPED | OUTPATIENT
Start: 2020-05-22 | End: 2020-05-27

## 2020-05-22 NOTE — PROGRESS NOTES
Due to COVID-19 ACTION PLAN, the patient's office visit was converted to a phone or video visit. Time Spent: 25 mins    Subjective     HPI:   Fili Geiger is a 38year old female who presents for complains of pain left ear starting yesterday, sore throat. evaluation of ear    History of sore throat   Currently denies sore throat but often sore throat is preceding symptoms of an infection.   Symptoms are not classic for strep but does remain in the differential.  Unfortunately could be developing symptoms for develops COVID symptoms. Return in about 4 days (around 5/26/2020) for recheck  virtual visit -ear and sore throat and clearance to go to work if pain resolved.     DO Vipul dAam understands phone evaluation is not a substitute

## 2020-05-22 NOTE — PATIENT INSTRUCTIONS
Cuidados personales para el dolor de garganta    El dolor de garganta aparece por muchas razones, por ejemplo, resfriados, alergias, humo del cigarrillo, contaminación del aire e infecciones causadas por virus o bacterias.  En cualquier dolly, la garganta · Para el dolor causado por alergias, pruebe nicole medicamentos antihistamínicos para bloquear la reacción alérgica. A menos que el dolor de garganta sea causado por zahida infección bacteriana, los antibióticos no le ayudarán.    Prevenga el dolor de gargant El barbijo puede ayudar a prevenir la propagación del virus de la COVID-19.  Los CDC aconsejan el uso de barbijos de howie en lugares público. Libby la emergencia sanitaria pública, es necesario que los barbijos médicos se reserven para los trabajadores de Lave el barbijo luego de varios usos de períodos cortos, de un uso prolongado o si se ha humedecido. Lávelo con mayor frecuencia si es necesario. Puede lavarlo en la lavadora o a mano con agua y [place].  Asegúrese de que esté completamente seco antes de util Paso 3. Coloque de manera longitudinal la parte superior del filtro para café en la mitad del rectángulo de howie. Luego, doble la howie uniformemente sobre el filtro para café.  Doble la parte superior e inferior hacia el centro para formar un Franky Cipro · No se asuste. Tenga en cuenta que hay otras enfermedades que pueden causar síntomas parecidos. · No vaya al Ullloyd Galo, a la escuela ni a lugares públicos. Limite el contacto físico con inma familiares. Limite las visitas.  No bese a nadie ni comparta utensi · Renata Willett en victoria casa y comienza el Ridalepa. No salga de casa, ivonne que necesite atención médica. No vaya al Jobie Breeding, a la escuela ni a lugares públicos. No use transporte público ni taxis.   · Siga todas las instrucciones que le haya dado victoria provee Actualmente, no hay medicamentos aprobados para prevenir o tratar el virus. Se están evaluando otros medicamentos y algunos fármacos experimentales para la COVID-19.  Se está evaluando si algunos medicamentos que se usan para tratar otras enfermedades sirve · Limpie con frecuencia las superficies de la casa con un desinfectante. Tupman incluye teléfonos, mesadas de la cocina, la manija de la heladera, superficies en el baño y otras superficies.   · No permita que nadie comparta artículos domésticos con la person Si tiene un sistema inmunitario debilitado y COVID-19, las instrucciones sobre cuándo suspender el aislamiento serán algo diferentes.  Algunas afecciones y tratamientos pueden debilitar el sistema inmunitario, ghassan el tratamiento contra el cáncer, los trasp · [address] del [facility].  · Criss preguntas si algo no está manolo. Anote las respuestas para no olvidarse. Starlette Mook de última modificación: 4/27/2020  © 0812-2975 [employer].  Alter Wall [address] [place] [address] Los CDC recomiendan no viajar a las zonas donde hay brotes de COVID-19 bajo ningún motivo que no sea urgente.  Para seguir las últimas recomendaciones de los CDC con respecto a los viajes, visite www.cdc.gov/coronavirus/2019-ncov/travelers. Por el Chiara, · No esté con personas enfermas. · Por el momento no hay pruebas de que los animales propaguen el SARS-CoV-2. Allegra siempre es ziegler lavarse las jeanie después de tocar cualquier animal. No toque animales que podrían estar enfermos.   · No comparta utensilio · Si no tiene acceso a [place] y jabón, use con frecuencia un gel para jeanie a base de alcohol. Asegúrese de que tenga al menos un 60 % de alcohol. · No se toque los ojos, la nariz ni la boca si no tiene las jeanie limpias.   · [address] · Llame a victoria proveedor de [facility] y [address] instrucciones. Es posible que restrinjan nima actividades y Bassam Alter a los que va por Wisam. Verifique las instrucciones en victoria comunidad sobre restricciones de O'andrews.  Es posible q

## 2020-05-29 ENCOUNTER — TELEPHONE (OUTPATIENT)
Dept: FAMILY MEDICINE CLINIC | Facility: CLINIC | Age: 45
End: 2020-05-29

## 2020-05-29 NOTE — TELEPHONE ENCOUNTER
Patient called stating she is still experiencing ear and throat pain. Her last appt was Friday 5/22.

## 2020-05-29 NOTE — TELEPHONE ENCOUNTER
LMOM to return call to the office. Provided pt office phone [phone] along with office hours given.

## 2020-05-29 NOTE — TELEPHONE ENCOUNTER
interpretor ID [id number]  Patient reports she completed antibiotics and they did help a little but know the pain Is returning to her ear  Can she get in office apt with her    Have you been in contact with someone who was confirmed or suspected to have Coron

## 2020-06-01 ENCOUNTER — OFFICE VISIT (OUTPATIENT)
Dept: FAMILY MEDICINE CLINIC | Facility: CLINIC | Age: 45
End: 2020-06-01

## 2020-06-01 VITALS
TEMPERATURE: 98 F | WEIGHT: 135.38 LBS | HEART RATE: 75 BPM | HEIGHT: 58.75 IN | BODY MASS INDEX: 27.66 KG/M2 | SYSTOLIC BLOOD PRESSURE: 102 MMHG | DIASTOLIC BLOOD PRESSURE: 60 MMHG | RESPIRATION RATE: 18 BRPM | OXYGEN SATURATION: 100 %

## 2020-06-01 DIAGNOSIS — J30.2 SEASONAL ALLERGIC RHINITIS, UNSPECIFIED TRIGGER: ICD-10-CM

## 2020-06-01 DIAGNOSIS — H92.03 OTALGIA OF BOTH EARS: Primary | ICD-10-CM

## 2020-06-01 PROBLEM — H92.01 RIGHT EAR PAIN: Status: RESOLVED | Noted: 2020-06-01 | Resolved: 2020-06-01

## 2020-06-01 PROBLEM — H92.01 RIGHT EAR PAIN: Status: ACTIVE | Noted: 2020-06-01

## 2020-06-01 PROCEDURE — 99214 OFFICE O/P EST MOD 30 MIN: CPT | Performed by: FAMILY MEDICINE

## 2020-06-01 RX ORDER — FEXOFENADINE HCL 180 MG/1
180 TABLET ORAL DAILY
Qty: 90 TABLET | Refills: 1 | Status: SHIPPED | OUTPATIENT
Start: 2020-06-01

## 2020-06-01 RX ORDER — MONTELUKAST SODIUM 10 MG/1
10 TABLET ORAL NIGHTLY PRN
Qty: 90 TABLET | Refills: 1 | Status: SHIPPED | OUTPATIENT
Start: 2020-06-01 | End: 2021-03-15

## 2020-06-01 RX ORDER — OLOPATADINE HYDROCHLORIDE 1 MG/ML
1 SOLUTION/ DROPS OPHTHALMIC 2 TIMES DAILY
Qty: 5 ML | Refills: 0 | Status: SHIPPED | OUTPATIENT
Start: 2020-06-01 | End: 2020-08-19

## 2020-06-01 NOTE — PROGRESS NOTES
CHIEF COMPLAINT: Patient presents with:  Ear Problem: Patient is c/o ear pain on left ear x3 weeks    HPI:     Joanna Enriquez is a 38year old female presents for left greater than right ear pain intermittent x3 weeks.   Patient was started on Zithromax on 5/ • No Known Problems Brother    • No Known Problems Sister    • No Known Problems Brother    • No Known Problems Brother    • No Known Problems Brother    • No Known Problems Daughter    • No Known Problems Son    • ADHD Son       Social History: Social groomed. Physical Exam  General: Well-nourished, well hydrated. No acute distress. No pallor. HEENT: normocephaly, atraumatic. PERRL EOMI bilaterally. Sclera clear and non icteric bilaterally.   Sclera without redness or drainage bilateral.  Upper lower Prescriptions     Signed Prescriptions Disp Refills   • Montelukast Sodium 10 MG Oral Tab 90 tablet 1     Sig: Take 1 tablet (10 mg total) by mouth nightly as needed.    • Olopatadine HCl 0.1 % Ophthalmic Solution 5 mL 0     Sig: Place 1 drop into both eyes

## 2020-06-01 NOTE — PATIENT INSTRUCTIONS
· Acetaminophen is name brand tylenol The maximum amount of tylenol (acetaminophen) is 4000mg daily. Do not exceed 11 pills daily of regular strength tylenol (acetaminophen) and or vicodin or you can cause permanent liver damage or liver failure.     Und · Drainage down your throat (postnasal drip)  · Sneezing  · Red, watery eyes  · Itchy nose, eyes, ears, and throat  · Plugged-up ears (ear congestion)  · Sore throat  · Coughing  · Sinus pain and swelling  · Headache  It may not be allergies  Other health · Improper cleaning of wax from the ear  · Bacterial infection of the mastoid bone (mastoiditis)  · Tumor  · Jaw pain  · Changes in pressure, such as from flying or scuba diving    The pain or discomfort may come and go.  You may hear clicking or popping so · Talk with your healthcare provider about using nasal spray decongestants. Don't use them for more than 3 days, or as directed by your healthcare provider. Longer use can make congestion worse.  Prescription nasal sprays from your healthcare provider don't · The mastoid bone surrounds the middle ear. · The external ear collects sound waves. · The ear canal carries those sound waves to the eardrum. · The eardrum vibrates from the sound waves, setting the middle ear bones in motion.   · The middle ear bones ? Fredrica Yoko and doors closed. Use air conditioning instead in your home and car. This filters the air. ? Change air conditioner filters often. ? Take a shower, wash your hair, and change clothes after being outdoors.   ? Put on a NIOSH-rated 95 filter · If you have sinus congestion or drainage, a saline nasal rinse may give relief. A saline nasal rinse lessens the swelling and clears excess mucus. This allows sinuses to drain. Prepackaged kits are sold at most drug stores.  These contain pre-mixed salt p © 8608-5803 [employer] [address]. [facility] [facility] [address] [place]. All rights reserved. This information is not intended as a substitute for professional medical care. Always follow your healthcare professional's instructions.         Prevent · Pneumonia (infection of the lungs in which fluid and mucus settle in the lungs, making breathing difficult)  · Worsening of chronic conditions such as heart failure, chronic lung disease, asthma, or diabetes  · Severe dehydration (loss of fluids)  · Sinu © 9031-8134 [employer]. [address] [facility] [place], [facility] [employer] [place]. All rights reserved. This information is not intended as a substitute for professional medical care. Always follow your healthcare professional's instructions.         Coronav Wash your mask after several short uses, 1 long use, or if it gets damp. Wash it more often if needed. You can wash it in a laundry washing machine. Or you can wash it by hand with soap and water. Make sure it’s fully dry before you wear it.    How to make Step 4. Take the 2 rubber bands or hair bands. Pull 1 end of the thin cloth rectangle through 1 band, and the other end through the second band. Then fold the ends of the cloth evenly to meet in the center. This is your mask. Step 5.  Now put the folded You will be advised to stay at least 6 feet from others as much as possible. This is called \"social distancing. \" You may be advised to stay at home and isolate yourself as much as possible if COVID-19 is in your area.  You may hear terms such as \"self is · There is no evidence right now that animals spread SARS-CoV-2. But it's always a good idea to wash your hands after touching any animals. Don't touch animals that may be sick. · Don’t share eating or drinking utensils with sick people.     If you leave h · The CDC advises wearing a cloth face mask in public. During a public health emergency, medical face masks may be reserved for healthcare workers. You may need to make a cloth face mask of your own.  You can do this using a bandana, T-shirt, or other cloth · Watch for symptoms of the virus. Call your provider if you have symptoms. Call your provider first before going to any clinic or hospital. See the CDC's symptom . · Stay home if you are sick for any reason.   When to call your healthcare provider

## 2020-08-18 NOTE — TELEPHONE ENCOUNTER
Pt called office asking to get a refill on her eye medication and also her allergy medication. Pt states she is completely out. Pt only speaks Canadian.

## 2020-08-18 NOTE — TELEPHONE ENCOUNTER
Pt requesting refill of   Requested Prescriptions     Pending Prescriptions Disp Refills   • Olopatadine HCl 0.1 % Ophthalmic Solution 5 mL 0     Sig: Place 1 drop into both eyes 2 (two) times daily.           No protocol available, routed to provider to re

## 2020-08-19 RX ORDER — OLOPATADINE HYDROCHLORIDE 1 MG/ML
1 SOLUTION/ DROPS OPHTHALMIC 2 TIMES DAILY
Qty: 5 ML | Refills: 3 | Status: SHIPPED | OUTPATIENT
Start: 2020-08-19 | End: 2021-03-15

## 2020-08-21 ENCOUNTER — TELEPHONE (OUTPATIENT)
Dept: FAMILY MEDICINE CLINIC | Facility: CLINIC | Age: 45
End: 2020-08-21

## 2020-08-21 NOTE — TELEPHONE ENCOUNTER
Interpretor ID [id number]  LOV 6/1/2020  Physical due 7/1/2020 for physical   Patient reports her pain started this week.  Felt that arms and legs are asleep today  She had been having problems with sciatic nerve long term    She feels it in her shoulders then

## 2020-08-21 NOTE — TELEPHONE ENCOUNTER
Pt called office stating she is having really bad pain and her arm and legs are going numb. Pt wants to know if she can get a muscle relaxer for the day time. Pt only speak Nepali.

## 2021-03-15 ENCOUNTER — OFFICE VISIT (OUTPATIENT)
Dept: FAMILY MEDICINE CLINIC | Facility: CLINIC | Age: 46
End: 2021-03-15

## 2021-03-15 VITALS
HEART RATE: 76 BPM | WEIGHT: 141 LBS | HEIGHT: 58.75 IN | SYSTOLIC BLOOD PRESSURE: 118 MMHG | BODY MASS INDEX: 28.81 KG/M2 | TEMPERATURE: 98 F | DIASTOLIC BLOOD PRESSURE: 60 MMHG | RESPIRATION RATE: 18 BRPM | OXYGEN SATURATION: 99 %

## 2021-03-15 DIAGNOSIS — Z13.29 SCREENING FOR ENDOCRINE, NUTRITIONAL, METABOLIC AND IMMUNITY DISORDER: ICD-10-CM

## 2021-03-15 DIAGNOSIS — J30.2 SEASONAL ALLERGIC RHINITIS, UNSPECIFIED TRIGGER: ICD-10-CM

## 2021-03-15 DIAGNOSIS — Z12.31 ENCOUNTER FOR SCREENING MAMMOGRAM FOR MALIGNANT NEOPLASM OF BREAST: ICD-10-CM

## 2021-03-15 DIAGNOSIS — M47.9 DEGENERATIVE JOINT DISEASE OF LOW BACK: ICD-10-CM

## 2021-03-15 DIAGNOSIS — N92.0 MENORRHAGIA WITH REGULAR CYCLE: ICD-10-CM

## 2021-03-15 DIAGNOSIS — F51.04 PSYCHOPHYSIOLOGICAL INSOMNIA: ICD-10-CM

## 2021-03-15 DIAGNOSIS — M79.7 FIBROMYALGIA: ICD-10-CM

## 2021-03-15 DIAGNOSIS — Z13.6 SCREENING FOR HEART DISEASE: ICD-10-CM

## 2021-03-15 DIAGNOSIS — M54.16 LUMBAR BACK PAIN WITH RADICULOPATHY AFFECTING LEFT LOWER EXTREMITY: ICD-10-CM

## 2021-03-15 DIAGNOSIS — F41.9 ANXIETY: ICD-10-CM

## 2021-03-15 DIAGNOSIS — Z13.228 SCREENING FOR ENDOCRINE, NUTRITIONAL, METABOLIC AND IMMUNITY DISORDER: ICD-10-CM

## 2021-03-15 DIAGNOSIS — Z13.0 SCREENING FOR ENDOCRINE, NUTRITIONAL, METABOLIC AND IMMUNITY DISORDER: ICD-10-CM

## 2021-03-15 DIAGNOSIS — Z13.21 SCREENING FOR ENDOCRINE, NUTRITIONAL, METABOLIC AND IMMUNITY DISORDER: ICD-10-CM

## 2021-03-15 DIAGNOSIS — Z00.00 ANNUAL PHYSICAL EXAM: Primary | ICD-10-CM

## 2021-03-15 PROBLEM — S46.811A TRAPEZIUS STRAIN, RIGHT, INITIAL ENCOUNTER: Status: RESOLVED | Noted: 2019-07-22 | Resolved: 2021-03-15

## 2021-03-15 PROCEDURE — 3074F SYST BP LT 130 MM HG: CPT | Performed by: FAMILY MEDICINE

## 2021-03-15 PROCEDURE — 3078F DIAST BP <80 MM HG: CPT | Performed by: FAMILY MEDICINE

## 2021-03-15 PROCEDURE — 3008F BODY MASS INDEX DOCD: CPT | Performed by: FAMILY MEDICINE

## 2021-03-15 PROCEDURE — 99396 PREV VISIT EST AGE 40-64: CPT | Performed by: FAMILY MEDICINE

## 2021-03-15 PROCEDURE — G0438 PPPS, INITIAL VISIT: HCPCS | Performed by: FAMILY MEDICINE

## 2021-03-15 RX ORDER — MONTELUKAST SODIUM 10 MG/1
10 TABLET ORAL NIGHTLY PRN
Qty: 90 TABLET | Refills: 3 | Status: SHIPPED | OUTPATIENT
Start: 2021-03-15

## 2021-03-15 RX ORDER — IBUPROFEN 800 MG/1
800 TABLET ORAL 3 TIMES DAILY
Qty: 30 TABLET | Refills: 0 | Status: SHIPPED | OUTPATIENT
Start: 2021-03-15

## 2021-03-15 RX ORDER — TRAZODONE HYDROCHLORIDE 100 MG/1
100 TABLET ORAL NIGHTLY
Qty: 30 TABLET | Refills: 1 | Status: SHIPPED | OUTPATIENT
Start: 2021-03-15 | End: 2021-04-13

## 2021-03-15 RX ORDER — OLOPATADINE HYDROCHLORIDE 1 MG/ML
1 SOLUTION/ DROPS OPHTHALMIC 2 TIMES DAILY
Qty: 5 ML | Refills: 11 | Status: SHIPPED | OUTPATIENT
Start: 2021-03-15

## 2021-03-15 NOTE — PROGRESS NOTES
Her physical  REASON FOR VISIT:    Vipul Gallagher is a 54year old female who presents for an [address]. Stress-due to pandemic adolescent children struggling with online school, works at [employer].   Admits has difficulty falling asleep and s reviewed  Problem (# of Occurrences) Relation (Name,Age of Onset)   No Known Problems (15) Mother, Maternal Grandmother, Maternal Grandfather, Paternal Grandmother, Paternal [de-identified], Sister, Brother, Sister, Brother, Brother, Brother, Daughter, Son   Francesco Wesley 77 04/25/2019    LDL 76 03/13/2013     Lab Results   Component Value Date    AST 18 04/25/2019    AST 16 03/13/2013     Lab Results   Component Value Date    ALT 22 04/25/2019    ALT 11 03/13/2013     Lab Results   Component Value Date    TSH 2.050 04/25/2 due on 05/28/2022    Chlamydia Screening Screen Annually age<25, if sex active/on OCPs; >24 high risk No results found for: CHLAMYDIA    Colonoscopy Screen Every 10 years There are no preventive care reminders to display for this patient.     Flex Sigmoidos flowsheet data found. Digoxin Serum Conc  Annually No results found for: DIGOXIN No flowsheet data found. Diabetes      HgbA1C  Annually No results found for: A1C No flowsheet data found.     Creat/alb ratio  Annually CREATININE (mg/dL)   Date Value Problem Relation Age of Onset   • Other (Other) Father         murdered   • Hypertension Mother    • Diabetes Mother    • No Known Problems Maternal Grandmother    • No Known Problems Maternal Grandfather    • No Known Problems Paternal Grandmother    • No NECK: supple, no adenopathy, no bruits  CHEST: no chest tenderness  BREAST: no dominant or suspicious mass  LUNGS: clear to auscultation  CARDIO: RRR without murmur  GI: good BS's, no masses, HSM or tenderness  : introitus is normal, normal perineum with (three) times daily. Take with food and water. Stop if GI side effects  Dispense: 30 tablet;  Refill: 0  Add duloxetine at next visit if no improvement with restoration of sleep  Discussed stretching, yoga beneficial in clinical studies with fibromyalgia Exercise counseling perfomed  STI Prevention counseling perfomed    SUGGESTED VACCINATIONS - Influenza, Pneumococcal, Zoster, Tetanus     Immunization History   Administered Date(s) Administered   • DT 07/17/2013   • MMR 07/17/2013   • Varicella Deferred (

## 2021-03-16 ENCOUNTER — NURSE ONLY (OUTPATIENT)
Dept: FAMILY MEDICINE CLINIC | Facility: CLINIC | Age: 46
End: 2021-03-16

## 2021-03-16 DIAGNOSIS — Z13.6 SCREENING FOR HEART DISEASE: ICD-10-CM

## 2021-03-16 DIAGNOSIS — Z00.00 ANNUAL PHYSICAL EXAM: ICD-10-CM

## 2021-03-16 DIAGNOSIS — Z13.0 SCREENING FOR ENDOCRINE, NUTRITIONAL, METABOLIC AND IMMUNITY DISORDER: ICD-10-CM

## 2021-03-16 DIAGNOSIS — Z13.29 SCREENING FOR ENDOCRINE, NUTRITIONAL, METABOLIC AND IMMUNITY DISORDER: ICD-10-CM

## 2021-03-16 DIAGNOSIS — N92.0 MENORRHAGIA WITH REGULAR CYCLE: ICD-10-CM

## 2021-03-16 DIAGNOSIS — Z13.21 SCREENING FOR ENDOCRINE, NUTRITIONAL, METABOLIC AND IMMUNITY DISORDER: ICD-10-CM

## 2021-03-16 DIAGNOSIS — Z13.228 SCREENING FOR ENDOCRINE, NUTRITIONAL, METABOLIC AND IMMUNITY DISORDER: ICD-10-CM

## 2021-03-16 LAB
ALBUMIN SERPL-MCNC: 3.7 G/DL (ref 3.4–5)
ALBUMIN/GLOB SERPL: 1 {RATIO} (ref 1–2)
ALP LIVER SERPL-CCNC: 56 U/L
ALT SERPL-CCNC: 24 U/L
ANION GAP SERPL CALC-SCNC: 3 MMOL/L (ref 0–18)
AST SERPL-CCNC: 15 U/L (ref 15–37)
BASOPHILS # BLD AUTO: 0.02 X10(3) UL (ref 0–0.2)
BASOPHILS NFR BLD AUTO: 0.3 %
BILIRUB SERPL-MCNC: 0.5 MG/DL (ref 0.1–2)
BUN BLD-MCNC: 15 MG/DL (ref 7–18)
BUN/CREAT SERPL: 33.3 (ref 10–20)
CALCIUM BLD-MCNC: 8.3 MG/DL (ref 8.5–10.1)
CHLORIDE SERPL-SCNC: 108 MMOL/L (ref 98–112)
CHOLEST SMN-MCNC: 149 MG/DL (ref ?–200)
CO2 SERPL-SCNC: 25 MMOL/L (ref 21–32)
CREAT BLD-MCNC: 0.45 MG/DL
DEPRECATED RDW RBC AUTO: 42.3 FL (ref 35.1–46.3)
EOSINOPHIL # BLD AUTO: 0.05 X10(3) UL (ref 0–0.7)
EOSINOPHIL NFR BLD AUTO: 0.8 %
ERYTHROCYTE [DISTWIDTH] IN BLOOD BY AUTOMATED COUNT: 13.6 % (ref 11–15)
GLOBULIN PLAS-MCNC: 3.7 G/DL (ref 2.8–4.4)
GLUCOSE BLD-MCNC: 88 MG/DL (ref 70–99)
HCT VFR BLD AUTO: 37.1 %
HDLC SERPL-MCNC: 45 MG/DL (ref 40–59)
HGB BLD-MCNC: 11.7 G/DL
IMM GRANULOCYTES # BLD AUTO: 0.01 X10(3) UL (ref 0–1)
IMM GRANULOCYTES NFR BLD: 0.2 %
LDLC SERPL CALC-MCNC: 84 MG/DL (ref ?–100)
LYMPHOCYTES # BLD AUTO: 1.36 X10(3) UL (ref 1–4)
LYMPHOCYTES NFR BLD AUTO: 22 %
M PROTEIN MFR SERPL ELPH: 7.4 G/DL (ref 6.4–8.2)
MCH RBC QN AUTO: 27.3 PG (ref 26–34)
MCHC RBC AUTO-ENTMCNC: 31.5 G/DL (ref 31–37)
MCV RBC AUTO: 86.7 FL
MONOCYTES # BLD AUTO: 0.32 X10(3) UL (ref 0.1–1)
MONOCYTES NFR BLD AUTO: 5.2 %
NEUTROPHILS # BLD AUTO: 4.42 X10 (3) UL (ref 1.5–7.7)
NEUTROPHILS # BLD AUTO: 4.42 X10(3) UL (ref 1.5–7.7)
NEUTROPHILS NFR BLD AUTO: 71.5 %
NONHDLC SERPL-MCNC: 104 MG/DL (ref ?–130)
OSMOLALITY SERPL CALC.SUM OF ELEC: 282 MOSM/KG (ref 275–295)
PATIENT FASTING Y/N/NP: YES
PATIENT FASTING Y/N/NP: YES
PLATELET # BLD AUTO: 246 10(3)UL (ref 150–450)
POTASSIUM SERPL-SCNC: 4 MMOL/L (ref 3.5–5.1)
RBC # BLD AUTO: 4.28 X10(6)UL
SODIUM SERPL-SCNC: 136 MMOL/L (ref 136–145)
TRIGL SERPL-MCNC: 98 MG/DL (ref 30–149)
TSI SER-ACNC: 2.45 MIU/ML (ref 0.36–3.74)
VLDLC SERPL CALC-MCNC: 20 MG/DL (ref 0–30)
WBC # BLD AUTO: 6.2 X10(3) UL (ref 4–11)

## 2021-03-16 PROCEDURE — 80061 LIPID PANEL: CPT | Performed by: FAMILY MEDICINE

## 2021-03-16 PROCEDURE — 84443 ASSAY THYROID STIM HORMONE: CPT | Performed by: FAMILY MEDICINE

## 2021-03-16 PROCEDURE — 85025 COMPLETE CBC W/AUTO DIFF WBC: CPT | Performed by: FAMILY MEDICINE

## 2021-03-16 PROCEDURE — 80053 COMPREHEN METABOLIC PANEL: CPT | Performed by: FAMILY MEDICINE

## 2021-03-16 NOTE — PROGRESS NOTES
Patient came in for draw of ordered fasting labs. Patient drawn out of left AC, x 1 attempt and tolerated well. 1 lav 1 lt grn tube drawn.

## 2021-03-22 ENCOUNTER — TELEPHONE (OUTPATIENT)
Dept: FAMILY MEDICINE CLINIC | Facility: CLINIC | Age: 46
End: 2021-03-22

## 2021-03-22 NOTE — TELEPHONE ENCOUNTER
Pt called office asking to speak to a nurse to discuss medication prescribed to her for her Depression. Pt states that she believes the medication prescribed is too high of a dosage and is worried to take it. Pt only speaks Slovenian.

## 2021-03-22 NOTE — TELEPHONE ENCOUNTER
Call placed using  Services.   ID#: [id number]  Pt states that she has not taken the Trazodone 100mg wants to be sure that it's the correct dosage Dr Arie Hernandez intended  Pt reassured that Dr. Arie Hernandez intended to prescribe this dose and fee

## 2021-03-25 ENCOUNTER — TELEPHONE (OUTPATIENT)
Dept: FAMILY MEDICINE CLINIC | Facility: CLINIC | Age: 46
End: 2021-03-25

## 2021-03-26 NOTE — TELEPHONE ENCOUNTER
Interpretor ID [id number]    Patient notified of results and instructions  Patient verbalizes understanding.

## 2021-03-26 NOTE — TELEPHONE ENCOUNTER
Unfortunately-no lab note was written and should have been released in my chart. Please apologized to patient. Labs show no significant abnormalities except mild anemia.   Patient should increase iron rich foods chicken, leafy greens, broccoli, kale etc.

## 2021-04-08 ENCOUNTER — TELEPHONE (OUTPATIENT)
Dept: FAMILY MEDICINE CLINIC | Facility: CLINIC | Age: 46
End: 2021-04-08

## 2021-04-08 NOTE — TELEPHONE ENCOUNTER
Pt called office stating she is not taking her Sleep medication anymore sine she feels like she is feeling worse. Pt wants to know what dr wants her to do. Pt only speaks Setswana.

## 2021-04-08 NOTE — TELEPHONE ENCOUNTER
[address] [facility] [id number]    Call placed to patient using  services. Patient was taking Trazodone. 1 hour after taking would start feeling like eyes got tired, but could not sleep. Would continue to feel tired during the day following.  E

## 2021-04-13 NOTE — PROGRESS NOTES
Due to COVID-19 ACTION PLAN, the patient's office visit was converted to a video visit. Time Spent: 26 mins + 4 mins writing note.     Patient presents with:  Medication Follow-Up    Subjective     HPI:   Vipul Gallagher is a 54year old female who presents f Disp: 90 tablet, Rfl: 3  Fexofenadine HCl 180 MG Oral Tab, Take 1 tablet (180 mg total) by mouth daily. , Disp: 90 tablet, Rfl: 1  ibuprofen 800 MG Oral Tab, Take 1 tablet (800 mg total) by mouth 3 (three) times daily. Take with food and water.  Stop if GI s 911 for worsening symptoms or acute distress. Patient/Caregiver Education: Patient/Caregiver Education: There are no barriers to learning. Medical education done. Outcome: Patient verbalizes understanding.  Patient is notified to call with any questio

## 2021-08-29 ENCOUNTER — HOSPITAL ENCOUNTER (OUTPATIENT)
Age: 46
Discharge: HOME | End: 2021-08-29
Payer: COMMERCIAL

## 2021-08-29 VITALS
HEART RATE: 87 BPM | BODY MASS INDEX: 23.04 KG/M2 | SYSTOLIC BLOOD PRESSURE: 108 MMHG | OXYGEN SATURATION: 100 % | WEIGHT: 130 LBS | TEMPERATURE: 98 F | DIASTOLIC BLOOD PRESSURE: 78 MMHG | HEIGHT: 63 IN | RESPIRATION RATE: 18 BRPM

## 2021-08-29 DIAGNOSIS — J01.40 ACUTE NON-RECURRENT PANSINUSITIS: Primary | ICD-10-CM

## 2021-08-29 DIAGNOSIS — R09.81 SINUS CONGESTION: ICD-10-CM

## 2021-08-29 DIAGNOSIS — Z20.822 LAB TEST NEGATIVE FOR COVID-19 VIRUS: ICD-10-CM

## 2021-08-29 LAB — SARS-COV-2 RNA RESP QL NAA+PROBE: NOT DETECTED

## 2021-08-29 PROCEDURE — U0002 COVID-19 LAB TEST NON-CDC: HCPCS | Performed by: PHYSICIAN ASSISTANT

## 2021-08-29 PROCEDURE — 99213 OFFICE O/P EST LOW 20 MIN: CPT | Performed by: PHYSICIAN ASSISTANT

## 2021-08-29 RX ORDER — AMOXICILLIN AND CLAVULANATE POTASSIUM 875; 125 MG/1; MG/1
1 TABLET, FILM COATED ORAL 2 TIMES DAILY
Qty: 20 TABLET | Refills: 0 | Status: SHIPPED | OUTPATIENT
Start: 2021-08-29 | End: 2021-09-08

## 2021-08-29 NOTE — ED INITIAL ASSESSMENT (HPI)
Pt here c/o sneezing for last week, coughing yesterday. Here for sinus pain. Pt was tested at [facility] on Friday for covid and still awaiting results. Hx of allergies.

## 2021-08-29 NOTE — ED PROVIDER NOTES
Patient Seen in: Immediate [address] [place] [address]      History   Patient presents with:  Cough/URI    Stated Complaint: congestion    HPI/Subjective:   HPI    69-year-old female who comes in today with a known history of acute allergic rhinitis she is oleg mastoid tenderness bilaterally   Nose:  Nares symmetrical, + erythema of bilateral nasal turbinates,+ yellow/green discharge; + bilateral maxillary sinus tenderness  Throat:  Lips, tongue, and mucosa are moist, pink, and intact; posterior pharynx without e tablet Refills: 0        I have given the patient instructions regarding her diagnosis, expectations, follow up, and return to the ER precautions.   I explained to the patient that emergent conditions may arise to return to the immediate care or ER for new,

## 2022-04-12 ENCOUNTER — TELEMEDICINE (OUTPATIENT)
Dept: FAMILY MEDICINE CLINIC | Facility: CLINIC | Age: 47
End: 2022-04-12

## 2022-04-12 DIAGNOSIS — J06.9 VIRAL UPPER RESPIRATORY TRACT INFECTION: Primary | ICD-10-CM

## 2022-04-12 PROCEDURE — 99213 OFFICE O/P EST LOW 20 MIN: CPT | Performed by: FAMILY MEDICINE

## 2022-04-12 NOTE — PROGRESS NOTES
Video Visit    This visit is conducted using Telemedicine with live, interactive video and audio. Floyd Olivier  verbally consents to a Video visit. Patient understands and accepts financial responsibility for any deductible, co-insurance and/or co-pays associated with this service. Duration of the service: 12:17 minutes  Ishmael Halsted is a 52 yo F with PMH of CHARLY, anxiety, and fibromyalgia p/w sinus problem. Summary of topics discussed:    Sinus problem:  Pt has had a 4-5 day history of sinus problems. She feels pressure at the saad of her head, as well is in her face. She states she had chills on Sunday. She has nasal congestion, some runny nose. She has no sore throat, no cough. No fever that she took with a temperature. She has no abdominal pain/N/V/D. Her symptoms feel the same from when they started. She states she gets allergies this time of year. She has been self treating with Allegra and Ibuprofen. She did not complete her Covid booster. No recent travel and no sick contacts. She has not had a Covid infection yet. ROS: as states per HPI  Physical Exam: Exam is limited due to no face to face visit today due to the Covid-19 pandemic. Pt is awake and alert, does not appear to be in acute distress. She has no cough, no wheezing, no conversational dyspnea. Assessment/Plan:  1) URI, Viral  - Day 4-5 of symptoms (onset on 4/8 or 4/9)  - r/o Covid, advised to complete an at home Covid test; if this is unavailable, theni also placed an order for Covid PCR test through [facility], provided pt with scheduling phone #  - please call office with Covid test results if she does at home test  - reassured pt this is most likely viral, since still within the first 7-10 days.  if Covid test is negative and not feeling better by end of week, then will consider Rx for Abx       Return for call [phone] to schedule appt for Covid test.      Edwina Brink MD

## 2023-03-09 ENCOUNTER — TELEPHONE (OUTPATIENT)
Dept: FAMILY MEDICINE CLINIC | Facility: CLINIC | Age: 48
End: 2023-03-09

## 2023-03-09 NOTE — TELEPHONE ENCOUNTER
Patient called back stating that she did not want to make an appointment with doctor. She was seeing someone else and that she was using a different insurance also, but would not tell me. I did tell her she needed to get Dr. Anita Zafar removed as her PCP and she did not want to but did not want to see her.

## 2023-06-26 ENCOUNTER — TELEPHONE (OUTPATIENT)
Dept: ORTHOPEDICS CLINIC | Facility: CLINIC | Age: 48
End: 2023-06-26

## 2023-07-26 NOTE — TELEPHONE ENCOUNTER
Patient calling to see if her paperwork is completed and when she can pick them up. Regimen: Zoledronic Acid every 1-2 years    Dr. Alvarenga is supervising provider today.    Reviewed and verified Advanced Directives: Yes: Advance Directive is in chart     Nursing Assessment: A focused nursing assessment  was performed and the patient reports the following: Nausea: NO  Vomiting: NO  Fever: NO  Chills: NO  Other signs of infection: NO  Bleeding: NO  Mucositis: NO  Diarrhea: NO  Constipation: NO  Anorexia: NO  Dysuria: NO  Urinary Bleeding: NO  Cough: NO  Shortness of Breath: NO  Fatigue/Weakness: NO  Numbness/Tingling: NO  Other Neuropathies: NO  Edema: NO  Rash: NO  Hand/Foot Syndrome: NO  Anxiety/Depression/Insomnia: NO  Pain: NO  Other:  Blood pressure elevated at visit, but patient states she always has high blood pressure at appointments. Patient states she will check her blood pressure at home and if pressures continue to be elevated she will contact her primary MD.    Pre-Treatment: - Patient has valid pre-authorization  - VS completed  - Height and weight verified  - Premed orders are verified prior to administration  - Treatment parameters verified in patient protocol  - Lab results checked - MD notified; creatinine WNL, ok to proceed per Dr. Krishnamurthy    Treatment: Refer to [facility] and MAR for line assessment and medication administration, Blood return confirmed before, during and after treatment administered and Infusion pump used for non-vesicant drugs    Post Treatment: Treatment tolerated well; no adverse reaction    Education: No new instructions needed    Next appointment scheduled: 07/26/24 for appointment with Dr. Krishnamurthy and lab work. Patient to complete bone scan prior to follow up    Patient instructed to call the office with any questions or concerns.    Patient Discharged: patient discharged to home per self, ambulatory    Fall risk 35  ([place] Fall Risk)    Distress Tool   Not indicated at this time.    Administrations This Visit     sodium chloride 0.9 % flush bag 250 mL     Admin  Date  07/26/2023 Action  New Bag Dose  250 mL Rate  50 mL/hr Route  Intravenous Administered By  Ana Cristina Rudolph, LOIDA          zoledronic acid (RECLAST) IVPB 5 mg     Admin Date  07/26/2023 Action  New Bag Dose  5 mg Rate  400 mL/hr Route  Intravenous Administered By  Ana Cristina Rudolph, RN

## (undated) DEVICE — GLOVE SURG SENSICARE SZ 7-1/2

## (undated) DEVICE — BANDAID COVERLET 1X3

## (undated) DEVICE — GLOVE SURG SENSICARE SZ 6-1/2

## (undated) DEVICE — NEEDLE SPINAL 22X5 405148

## (undated) DEVICE — NEEDLE SPINAL 22X3-1/2 BLK

## (undated) DEVICE — REMOVER DURAPREP 3M

## (undated) DEVICE — GLOVE SURG SENSICARE SZ 7

## (undated) DEVICE — PAIN TRAY: Brand: MEDLINE INDUSTRIES, INC.

## (undated) NOTE — LETTER
Date: 1/7/2020    Patient Name: Jarod Alonso          To Whom it may concern: This letter has been written at the patient's request. The above patient was seen at the [facility] for treatment of a medical condition.     This patient should

## (undated) NOTE — LETTER
05/22/20    Dear Employer,  At [facility], we are taking special precautions and doing everything we can to prevent the spread of COVID-19 (coronavirus disease 2019).  During this time, we ask for your assistance regarding physician documentatio

## (undated) NOTE — LETTER
Date: 2/12/2020    Patient Name: Chris Flower          To Whom it may concern: This letter has been written at the patient's request. The above patient was seen at the [facility] for treatment of a medical condition.     This patient should

## (undated) NOTE — LETTER
Date: 7/22/2019    Patient Name: Valerie Mendoza          To Whom it may concern: This letter has been written at the patient's request. The above patient was seen at the [facility] for treatment of a medical condition.     This patient

## (undated) NOTE — LETTER
Date: 3/30/2020    Patient Name: Gracia Luis          To Whom it may concern: This letter has been written at the patient's request. The above patient was seen at the [facility] for treatment of a medical condition.     This patient should

## (undated) NOTE — LETTER
Date: 2/26/2020    Patient Name: Gretchen Hanks          To Whom it may concern: This letter has been written at the patient's request. The above patient was seen at the [facility] for treatment of a medical condition.     This patient should

## (undated) NOTE — LETTER
Ministerio Booker MD        I acknowledge that I have been informed of the risk involved by refusing the pregnancy test on Rebecca Sommer.     I, thereby, release E